# Patient Record
Sex: FEMALE | Race: BLACK OR AFRICAN AMERICAN | Employment: UNEMPLOYED | URBAN - METROPOLITAN AREA
[De-identification: names, ages, dates, MRNs, and addresses within clinical notes are randomized per-mention and may not be internally consistent; named-entity substitution may affect disease eponyms.]

---

## 2022-03-25 ENCOUNTER — ROUTINE PRENATAL (OUTPATIENT)
Dept: OBGYN CLINIC | Age: 32
End: 2022-03-25

## 2022-03-25 VITALS
HEIGHT: 67 IN | BODY MASS INDEX: 22.88 KG/M2 | HEART RATE: 85 BPM | SYSTOLIC BLOOD PRESSURE: 142 MMHG | DIASTOLIC BLOOD PRESSURE: 91 MMHG | WEIGHT: 145.8 LBS

## 2022-03-25 DIAGNOSIS — Z34.80 PRENATAL CARE OF MULTIGRAVIDA, ANTEPARTUM: ICD-10-CM

## 2022-03-25 DIAGNOSIS — Z12.4 ENCOUNTER FOR PAPANICOLAOU SMEAR FOR CERVICAL CANCER SCREENING: ICD-10-CM

## 2022-03-25 DIAGNOSIS — Z34.80 ENCOUNTER FOR SUPERVISION OF NORMAL PREGNANCY IN MULTIGRAVIDA, ANTEPARTUM: ICD-10-CM

## 2022-03-25 DIAGNOSIS — R03.0 ELEVATED BLOOD PRESSURE READING: ICD-10-CM

## 2022-03-25 DIAGNOSIS — Z11.3 SCREENING EXAMINATION FOR VENEREAL DISEASE: Primary | ICD-10-CM

## 2022-03-25 DIAGNOSIS — F32.A DEPRESSION AFFECTING PREGNANCY: ICD-10-CM

## 2022-03-25 DIAGNOSIS — O99.340 DEPRESSION AFFECTING PREGNANCY: ICD-10-CM

## 2022-03-25 PROBLEM — Z98.891 H/O: C-SECTION: Status: ACTIVE | Noted: 2022-03-25

## 2022-03-25 LAB
ANTIBODY SCREEN, EXTERNAL: NEGATIVE
CHLAMYDIA, EXTERNAL: NEGATIVE
HBSAG, EXTERNAL: NEGATIVE
HEPATITIS C AB,   EXT: NEGATIVE
HIV, EXTERNAL: NON REACTIVE
N. GONORRHEA, EXTERNAL: NEGATIVE
RPR, EXTERNAL: NON REACTIVE
RUBELLA, EXTERNAL: NORMAL

## 2022-03-25 PROCEDURE — 0501F PRENATAL FLOW SHEET: CPT | Performed by: OBSTETRICS & GYNECOLOGY

## 2022-03-25 RX ORDER — ESCITALOPRAM OXALATE 10 MG/1
5 TABLET ORAL EVERY OTHER DAY
COMMUNITY
Start: 2022-03-12

## 2022-03-25 NOTE — PROGRESS NOTES
[facility]  [email or web address]  [phone]    Noemi Nam MD, [address] [facility]     Chief complaint:  Irregular cycles  Last cycle; Patient's last menstrual period was 2022. This is a new concern and an evaluation is planned. The patient has not been previously tested for hemoglobin electrophoresis, CF, or SMA. C/o migraines. She is trying to wean to 10mg lexapro. She has been taking 20mg lexapro the past year, she tried to wean to 10mg, was having bad side affects. C/o depression/anxeity/panic attacks. C/o feeling light headed & cold. C/o trouble breathing sometimes. Last pregnancy emergency . Current pregnancy history:  Andrey Miller is a , 32 y.o. female BLACK/   She presents for the evaluation of irregular menses and a positive pregnancy test.    LMP history:  Patient's last menstrual period was 2022. The date of the beginning of her last menstrual period is certain. Her menses are regular. Her cycles occur about every 4 weeks. A urine pregnancy test was positive about 3 weeks ago. She was not using contraception at the estimated time of conception. Based on her LMP, her EGA is 8 weeks,0 days with and EDC of 2022. Ultrasound data:  She had an ultrasound today which revealed a viable marie pregnancy with a gestational age of 11 weeks and 6 days giving an EDC of 10/29/2022. TA ULTRASOUND PERFORMED  A SINGLE VIABLE 8W6D IUP IS SEEN WITH NORMAL CARDIAC RHYTHM. GESTATIONAL AGE BASED ON TODAYS ULTRASOUND. A NORMAL YOLK SAC IS SEEN. RIGHT OVARY APPEARS WITHIN NORMAL LIMITS. LEFT OVARY APPEARS WITHIN NORMAL LIMITS. NO FREE FLUID SEEN IN THE CDS. Pregnancy symptoms:  She reports fatigue  breast tenderness  nausea  \"morning sickness\". She denies fatigue  breast tenderness  nausea  \"morning sickness\"  positive home pregnancy test  frequent urination.   Since she found out she is pregnant, she has gained, 12 lbs. She reports her prepregnancy weight as 132 pounds. Relevant past pregnancy history:  She has the following pregnancy history:emergency  d/t BP  She does not have a history of  delivery. She does have a history of a prior  section. Relevant past medical history:(relevant to this pregnancy): Anxiety/depression     Pap smear history:  Last pap smear: none on file    Occupational history  Her occupation is: unemployed. Substance history:   She does report current tobacco use, she is trying to quit. She does not report current alcohol use. She does report current drug use, MJ. Exposure history: There are not indoor cat(s) in the home. The patient was instructed not to change cat litter boxes during pregnancy. She does report close contact with children on a regular basis. She has chicken pox or the vaccine in the past.   Patient does not report issues with domestic violence. Genetic Screening/Teratology Counseling:   (Includes patient, baby's father, or anyone in either family with:)  3.  Patient's age >/= 28 at EDC?--31      FOB age: 29years old. 2.  Thalassemia ([facility], [facility], [facility], or  background): MCV<80?--no  3. Neural tube defect (meningomyelocele, spina bifida, anencephaly)? --no  4. Congenital heart defect?--no  5. Down syndrome?--no  6. Leonardo-Sachs ([address] [place] [address])? --no  7. Canavan's Disease?--no  8. Familial Dysautonomia?--no   9. Sickle cell disease or trait ()? --yes and AA     Has she been tested for sickle trait: Unknown  10. Hemophilia or other blood disorders?--no  11. Muscular dystrophy?--no  12. Cystic fibrosis? --no  13. Mario's Chorea?--no  14. Mental retardation/autism (if yes was person tested for Fragile X)?-no  15. Other inherited genetic or chromosomal disorder?- no  16. Maternal metabolic disorder (DM, PKU, etc)? --no  17.   Patient or FOB with a child with a birth defect not listed above?--pt's sister : spina bifida  17a. Patient or FOB with a birth defect themselves?--no  25. Recurrent pregnancy loss, or stillbirth?--no  19. Any medications since LMP other than prenatal vitamins (include vitamins, supplements, OTC meds, drugs, alcohol)? --     20. Any other genetic/environmental exposure to discuss?--no. Infection History:  1. Lives with someone with TB or TB exposed?--no  2. Patient or partner has history of genital herpes?--no  3. Rash or viral illness since LMP?--no  4. History of STD (GC, CT, HPV, syphilis, HIV)? --no  5. Have you received a flu vaccine for the most recent flu season? -- no or unsure  6. Have you or your sexual partner(s) travelled to a [facility] area in the last six months? -- no  7. Have you received the COVID-19 vaccine? -- yes  No booster  Past Medical History:   Diagnosis Date    Abnormal Pap smear     LGSIL-colpo    Depression affecting pregnancy 3/25/2022    Essential hypertension     blood pressure up in office today    HX OTHER MEDICAL     recurrent UTI     History reviewed. No pertinent surgical history.   Social History     Occupational History    Not on file   Tobacco Use    Smoking status: Current Some Day Smoker     Last attempt to quit: 2014     Years since quittin.6    Smokeless tobacco: Not on file   Substance and Sexual Activity    Alcohol use: No    Drug use: Yes     Types: Marijuana    Sexual activity: Yes     Partners: Male     Birth control/protection: None     Family History   Problem Relation Age of Onset    Diabetes Mother     Hypertension Mother     Psychiatric Disorder Brother     Mental Retardation Brother     Heart Disease Maternal Grandmother     Hypertension Maternal Grandmother     Hypertension Maternal Grandfather     Diabetes Paternal Grandfather     Elevated Lipids Paternal Grandfather      OB History    Para Term  AB Living   3 1 1 0 1 1   SAB IAB Ectopic Molar Multiple Live Births   0 0 0   0 1      # Outcome Date GA Lbr Dennis/2nd Weight Sex Delivery Anes PTL Lv   3 Current            2 Term 05/03/12 37w2d  6 lb 5 oz (2.863 kg) M CS-Unspec   KALEB      Birth Comments: System Generated. Please review and update pregnancy details. 1 AB 07/20/11 5w0d       DEC     No Known Allergies  Prior to Admission medications    Medication Sig Start Date End Date Taking? Authorizing Provider   escitalopram oxalate (LEXAPRO) 10 mg tablet Take 10 mg by mouth daily. 3/12/22  Yes Provider, Historical   naproxen (NAPROSYN) 375 mg tablet Take 1 Tab by mouth two (2) times daily (with meals). Patient not taking: Reported on 3/25/2022 8/6/14   Tasha Appiah MD   prenatal vit-iron fumarate-fa (RIGHT STEP PRENATAL VITAMINS) 27-0.8 mg Tab tablet Take 1 Tab by mouth daily.   Patient not taking: Reported on 3/25/2022    Other, MD Arcelia        Review of Systems - History obtained from the patient  Constitutional: negative for weight loss, fever, night sweats  HEENT: negative for hearing loss, earache, congestion, snoring, sorethroat  CV: negative for chest pain, palpitations, edema  Resp: negative for cough, shortness of breath, wheezing  GI: negative for change in bowel habits, abdominal pain, black or bloody stools  : negative for frequency, dysuria, hematuria, vaginal discharge  MSK: negative for back pain, joint pain, muscle pain  Breast: negative for breast lumps, nipple discharge, galactorrhea  Skin :negative for itching, rash, hives  Neuro: negative for dizziness, headache, confusion, weakness  Psych: negative for anxiety, depression, change in mood  Heme/lymph: negative for bleeding, bruising, pallor    Objective:  Visit Vitals  BP (!) 142/91   Pulse 85   Ht 5' 7\" (1.702 m)   Wt 145 lb 12.8 oz (66.1 kg)   LMP 01/28/2022   BMI 22.84 kg/m²       Physical Exam:   Constitutional  · Appearance: well-nourished, well developed, alert, in no acute distress    HENT  · Head  · Face: appears normal  · Eyes: appear normal  · Ears: normal  · Mouth: normal  · Lips: no lesions    Neck  · Inspection/Palpation: normal appearance, no masses or tenderness  · Lymph Nodes: no lymphadenopathy present  · Thyroid: gland size normal, nontender, no nodules or masses present on palpation    Chest  · Respiratory Effort: breathing unlabored  · Auscultation: normal breath sounds    Cardiovascular  · Heart:  · Auscultation: regular rate and rhythm without murmur    Breasts  · Inspection of Breasts: breasts symmetrical, no skin changes, no discharge present, nipple appearance normal, no skin retraction present  · Palpation of Breasts and Axillae: no masses present on palpation, no breast tenderness  · Axillary Lymph Nodes: no lymphadenopathy present    Gastrointestinal  · Abdominal Examination: abdomen non-tender to palpation, normal bowel sounds, no masses present  · Liver and spleen: no hepatomegaly present, spleen not palpable  · Hernias: no hernias identified    Genitourinary  · External Genitalia: normal appearance for age, no discharge present, no tenderness present, no inflammatory lesions present, no masses present, no atrophy present  · Vagina: normal vaginal vault without central or paravaginal defects, no discharge present, no inflammatory lesions present, no masses present  · Bladder: non-tender to palpation  · Urethra: appears normal  · Cervix: normal appearing with discharge or lesions, os closed  · Uterus: enlarged, normal shape, soft  · Adnexa: no adnexal tenderness present, no adnexal masses present  · Perineum: perineum within normal limits, no evidence of trauma, no rashes or skin lesions present  · Anus: anus within normal limits, no hemorrhoids present  · Inguinal Lymph Nodes: no lymphadenopathy present    Skin  · General Inspection: no rash, no lesions identified    Neurologic/Psychiatric  · Mental Status:  · Orientation: grossly oriented to person, place and time  · Mood and Affect: mood normal, affect appropriate    Assessment:   Irregular cycles  Encounter Diagnoses   Name Primary?  Screening examination for venereal disease Yes    Encounter for Papanicolaou smear for cervical cancer screening     Encounter for supervision of normal pregnancy in multigravida, antepartum     Prenatal care of multigravida, antepartum     Elevated blood pressure reading     Depression affecting pregnancy      Due date:  edc    Plan:   We discussed options of genetic screening and diagnostic testing including:  CF testing, CVS, amniocentesis first trimester screening/NT, MSAFP, and NIPT (handout given to patient for review and consent)  She is not interested in prenatal genetic testing of her fetus. Plan: FS MFM (labile BP, fh spina bifida: sister, Paradise Jordan)  The course of pregnancy discussed including visit schedule, ultrasounds, lab testing, etc.  Pt advised to avoid alcoholic beverages and illicit/recreational drugs use  Recommend taking prenatal vitamins or folic acid daily with DHA/fish oil. The hospital and practice style discussed with coverage system. We discussed nutrition, toxoplasmosis precautions, sexual activity, exercise, need for influenza vaccine, environmental and work hazards, travel advice, screen for domestic violence, need for seat belts. We discussed seafood, unpasteurized dairy products, deli meat, artificial sweeteners, and caffeine intake. We recommend avoiding chemical and toxin exposures when possible. Information on prenatal and breastfeeding classes given. Information on circumcision given in ACOG packet. Patient encouraged not to smoke. Discussed current prescription drug use. Given medication list.  Discussed the use of over the counter medications and chemicals. She is advised to contact her MD with any questions.     Pt understands risk of hemorrhage during pregnancy and post delivery and would accept blood products if necessary in life-threatening emergencies  We discussed signs and symptoms of abnormal pregnancies and miscarriage. Handouts given to pt. We discussed potential risk of pregnancy and maternal complications if patient has a COVID-19 infection during pregnancy and reviewed COVID-19 precautions and avoiding high risk transmission situations. Disc importance of mood management in pregnancy: pt unable to tolerate being off lexapro  Disc rba of SSRI/SRNI during pregnancy and affects on fetus and  including growth and development issues and serotonin withdrawal.  Rec notifying pediatrician if taking lexapro at [address] Disc ? CHTN, will check pr cr ratio and follow BP      Physician review of ultrasound performed by technician  Today's ultrasound report and images were reviewed and discussed with the patient. Please see images and imaging report entered by technician in PACS for more detail and progress note and diagnosis entered by MD.    Abad Peres MD    Orders Placed This Encounter    CULTURE, URINE    HEMOGLOBIN FRACTIONATION    HEPATITIS C AB    HEP B SURFACE AG    HIV SCREEN, [address] [place] [address]. W/REFLEX CONFIRM    CBC W/O DIFF    RUBELLA AB, IGG    RPR    PROTEIN/CREATININE RATIO, URINE    TYPE & SCREEN    escitalopram oxalate (LEXAPRO) 10 mg tablet    PAP IG, CT-NG, HPV RFX HPV 16/18,45     Follow-up and Dispositions    · Return in about 4 weeks (around 2022). On this date, 3/25/2022,  I have spent 45 minutes reviewing previous notes, test results and face to face with the patient discussing the diagnosis and importance of compliance with the treatment plan as well as documenting on the day of the visit.

## 2022-03-25 NOTE — PATIENT INSTRUCTIONS
Learning About Pregnancy  Your Care Instructions     Your health in the early weeks of your pregnancy is particularly important for your baby's health. Take good care of yourself. Anything you do that harms your body can also harm your baby. Make sure to go to all of your doctor appointments. Regular checkups will help keep you and your baby healthy. How can you care for yourself at home? Diet    · Eat a balanced diet. Make sure your diet includes plenty of beans, peas, and leafy green vegetables.     · Do not skip meals or go for many hours without eating. If you are nauseated, try to eat a small, healthy snack every 2 to 3 hours.     · Do not eat fish that has a high level of mercury, such as shark, swordfish, or mackerel. Do not eat more than one can of tuna each week.     · Drink plenty of fluids. If you have kidney, heart, or liver disease and have to limit fluids, talk with your doctor before you increase the amount of fluids you drink.     · Cut down on caffeine, such as coffee, tea, and cola.     · Do not drink alcohol, such as beer, wine, or hard liquor.     · Take a multivitamin that contains at least 400 micrograms (mcg) of folic acid to help prevent birth defects. Fortified cereal and whole wheat bread are good additional sources of folic acid.     · Increase the calcium in your diet. Try to drink a quart of skim milk each day. You may also take calcium supplements and choose foods such as cheese and yogurt. Lifestyle    · Make sure you go to your follow-up appointments.     · Get plenty of rest. You may be unusually tired while you are pregnant.     · Get at least 30 minutes of exercise on most days of the week. Walking is a good choice. If you have not exercised in the past, start out slowly. Take several short walks each day.     · Do not smoke. If you need help quitting, talk to your doctor about stop-smoking programs.  These can increase your chances of quitting for good.     · Do not touch cat feces or litter boxes. Also, wash your hands after you handle raw meat, and fully cook all meat before you eat it. Wear gloves when you work in the yard or garden, and wash your hands well when you are done. Cat feces, raw or undercooked meat, and contaminated dirt can cause an infection that may harm your baby or lead to a miscarriage.     · Avoid things that can make your body too hot and may be harmful to your baby, such as a hot tub or sauna. Or talk with your doctor before doing anything that raises your body temperature. Your doctor can tell you if it's safe.     · Avoid chemical fumes, paint fumes, or poisons.     · Do not use illegal drugs, marijuana, or alcohol. Medicines    · Review all of your medicines with your doctor. Some of your routine medicines may need to be changed to protect your baby.     · Use acetaminophen (Tylenol) to relieve minor problems, such as a mild headache or backache or a mild fever with cold symptoms. Do not use nonsteroidal anti-inflammatory drugs (NSAIDs), such as ibuprofen (Advil, Motrin) or naproxen (Aleve), unless your doctor says it is okay.     · Do not take two or more pain medicines at the same time unless the doctor told you to. Many pain medicines have acetaminophen, which is Tylenol. Too much acetaminophen (Tylenol) can be harmful.     · Take your medicines exactly as prescribed. Call your doctor if you think you are having a problem with your medicine. To manage morning sickness    · If you feel sick when you first wake up, try eating a small snack (such as crackers) before you get out of bed. Allow some time to digest the snack, and then get out of bed slowly.     · Do not skip meals or go for long periods without eating.  An empty stomach can make nausea worse.     · Eat small, frequent meals instead of three large meals each day.     · Drink plenty of fluids.     · Eat foods that are high in protein but low in fat.     · If you are taking iron supplements, ask your doctor if they are necessary. Iron can make nausea worse.     · Avoid any smells, such as coffee, that make you feel sick.     · Get lots of rest. Morning sickness may be worse when you are tired. Follow-up care is a key part of your treatment and safety. Be sure to make and go to all appointments, and call your doctor if you are having problems. It's also a good idea to know your test results and keep a list of the medicines you take. Where can you learn more? Go to http://www.gray.com/  Enter E382 in the search box to learn more about \"Learning About Pregnancy. \"  Current as of: June 16, 2021               Content Version: 13.2  © 1933-5709 [employer]wise, Inc[employer]. Care instructions adapted under license by [employer] (which disclaims liability or warranty for this information). If you have questions about a medical condition or this instruction, always ask your healthcare professional. [place] [address] any warranty or liability for your use of this information.

## 2022-03-27 LAB
CREAT UR-MCNC: 93.8 MG/DL
PROT UR-MCNC: 27.4 MG/DL
PROT/CREAT UR: 292 MG/G CREAT (ref 0–200)

## 2022-03-27 NOTE — PROGRESS NOTES
Update chart, PN labs/problem list, if needed  Add to PL ur pr cr ratio: 292  Please start ob episode with first viable OB visit

## 2022-03-28 LAB
ABO GROUP BLD: NORMAL
BLD GP AB SCN SERPL QL: NEGATIVE
ERYTHROCYTE [DISTWIDTH] IN BLOOD BY AUTOMATED COUNT: 13 % (ref 11.7–15.4)
HBV SURFACE AG SERPL QL IA: NEGATIVE
HCT VFR BLD AUTO: 38 % (ref 34–46.6)
HCV AB S/CO SERPL IA: <0.1 S/CO RATIO (ref 0–0.9)
HGB A MFR BLD ELPH: 97.2 % (ref 96.4–98.8)
HGB A2 MFR BLD ELPH: 2.8 % (ref 1.8–3.2)
HGB BLD-MCNC: 12.4 G/DL (ref 11.1–15.9)
HGB F MFR BLD ELPH: 0 % (ref 0–2)
HGB FRACT BLD-IMP: NORMAL
HGB S MFR BLD ELPH: 0 %
HIV 1+2 AB+HIV1 P24 AG SERPL QL IA: NON REACTIVE
MCH RBC QN AUTO: 30.5 PG (ref 26.6–33)
MCHC RBC AUTO-ENTMCNC: 32.6 G/DL (ref 31.5–35.7)
MCV RBC AUTO: 93 FL (ref 79–97)
PLATELET # BLD AUTO: 262 X10E3/UL (ref 150–450)
RBC # BLD AUTO: 4.07 X10E6/UL (ref 3.77–5.28)
RH BLD: POSITIVE
RPR SER QL: NON REACTIVE
RUBV IGG SERPL IA-ACNC: 7.89 INDEX
WBC # BLD AUTO: 6.5 X10E3/UL (ref 3.4–10.8)

## 2022-03-29 DIAGNOSIS — Z34.80 PRENATAL CARE OF MULTIGRAVIDA, ANTEPARTUM: ICD-10-CM

## 2022-03-29 DIAGNOSIS — Z82.79 FAMILY HISTORY OF SPINA BIFIDA: ICD-10-CM

## 2022-03-29 DIAGNOSIS — R09.89 LABILE BLOOD PRESSURE: Primary | ICD-10-CM

## 2022-03-29 LAB
C TRACH RRNA CVX QL NAA+PROBE: NEGATIVE
CYTOLOGIST CVX/VAG CYTO: NORMAL
CYTOLOGY CVX/VAG DOC CYTO: NORMAL
CYTOLOGY CVX/VAG DOC THIN PREP: NORMAL
CYTOLOGY HISTORY:: NORMAL
DX ICD CODE: NORMAL
HPV I/H RISK 4 DNA CVX QL PROBE+SIG AMP: NEGATIVE
Lab: NORMAL
N GONORRHOEA RRNA CVX QL NAA+PROBE: NEGATIVE
OTHER STN SPEC: NORMAL
STAT OF ADQ CVX/VAG CYTO-IMP: NORMAL

## 2022-03-29 NOTE — PROGRESS NOTES
Normal pap smear, message sent if [address] active. Update PMH/HM: include: Date of pap, Cytology: wnl. For HR HPV results: list NEG or POS, when done.   Update pnl

## 2022-04-04 ENCOUNTER — TELEPHONE (OUTPATIENT)
Dept: OBGYN CLINIC | Age: 32
End: 2022-04-04

## 2022-04-04 NOTE — TELEPHONE ENCOUNTER
TP pt calling 10w2d c/o migraines daily. She uses tylenol with no improvement. She denies n/v. She states she is drinking plenty of water. Please advise.

## 2022-04-22 ENCOUNTER — ROUTINE PRENATAL (OUTPATIENT)
Dept: OBGYN CLINIC | Age: 32
End: 2022-04-22
Payer: MEDICAID

## 2022-04-22 VITALS
DIASTOLIC BLOOD PRESSURE: 85 MMHG | HEIGHT: 67 IN | BODY MASS INDEX: 23.86 KG/M2 | SYSTOLIC BLOOD PRESSURE: 131 MMHG | WEIGHT: 152 LBS

## 2022-04-22 DIAGNOSIS — Z34.80 PRENATAL CARE OF MULTIGRAVIDA, ANTEPARTUM: Primary | ICD-10-CM

## 2022-04-22 LAB — URINALYSIS, EXTERNAL: NORMAL

## 2022-04-22 PROCEDURE — 0502F SUBSEQUENT PRENATAL CARE: CPT | Performed by: OBSTETRICS & GYNECOLOGY

## 2022-04-22 NOTE — PROGRESS NOTES
_ [facility]  [email or web address]  [phone]    Jessica Wells MD, FACOG     Follow-up OB visit    Chief Complaint   Patient presents with   [facility] Routine Prenatal Visit       Patient Active Problem List    Diagnosis Date Noted    H/O:  2022    Prenatal care of multigravida, antepartum 2022    Depression affecting pregnancy 2022    Gestational hypertension 2012    Missed  2011          The patient reports the following concerns: declines genetic testing last visit. On 10mg lexapro now, doing well with it. Last visit, her EPDS was 20. Ur cx today, not collected at EOB visit. Trying to wean lexapro with PCP: was on 20, then weaning to 10, did not tolerate every other day. No weapons at home. But has had SI in past.     Vitals:    22 0951   BP: 131/85   Weight: 152 lb (68.9 kg)   Height: 5' 7\" (1.702 m)     See PN flowsheet for exam    32 y.o.  12w6d   Encounter Diagnosis   Name Primary?  Prenatal care of multigravida, antepartum Yes   ? CHTN  Depression    Declines genetic testing/screening  Disc balancing risk of lexapro vs management of depression  Rec adding counseling: Ho given  Plan MFM fu   [] SAB/bleeding precautions reviewed   [] PTL/PPROM precautions reviewed   [] Labor precautions reviewed   [] Fetal kick counts discussed   [] Labs reviewed with patient   [] [place] precautions reviewed   [] Consent reviewed   [] Handouts given to pt   [] Glucola handout    [] GBS/labor/Magic Hour handout   []    [] We reviewed CDC recommendations for Tdap for patient and close contacts and RBA of receiving in pregnancy, advised obtaining in third trimester   [] Reviewed healthy nutrition in pregnancy and good exercise practices   [] We disc safer medications in pregnancy and referred patient to [facility] resources   [] We reviewed CDC recommendations for flu vaccine and RBA of receiving in pregnancy   []    []    []           Orders Placed This Encounter   Brenda Estrada MD

## 2022-04-25 LAB — BACTERIA UR CULT: NORMAL

## 2022-04-25 NOTE — PROGRESS NOTES
No UTI  If EOB urine: update PNL. If FOB but not EOB ur cx: add to OB PL: neg ur cx with date  [facility] message sent if active.

## 2022-04-26 DIAGNOSIS — Z34.80 PRENATAL CARE OF MULTIGRAVIDA, ANTEPARTUM: ICD-10-CM

## 2022-05-20 ENCOUNTER — ROUTINE PRENATAL (OUTPATIENT)
Dept: OBGYN CLINIC | Age: 32
End: 2022-05-20
Payer: MEDICAID

## 2022-05-20 VITALS
WEIGHT: 152 LBS | HEIGHT: 67 IN | SYSTOLIC BLOOD PRESSURE: 135 MMHG | BODY MASS INDEX: 23.86 KG/M2 | DIASTOLIC BLOOD PRESSURE: 84 MMHG | HEART RATE: 99 BPM

## 2022-05-20 DIAGNOSIS — Z34.80 PRENATAL CARE OF MULTIGRAVIDA, ANTEPARTUM: Primary | ICD-10-CM

## 2022-05-20 DIAGNOSIS — R03.0 ELEVATED BLOOD PRESSURE READING: ICD-10-CM

## 2022-05-20 DIAGNOSIS — O26.899 VAGINAL DISCHARGE DURING PREGNANCY, ANTEPARTUM: ICD-10-CM

## 2022-05-20 DIAGNOSIS — N89.8 VAGINAL DISCHARGE DURING PREGNANCY, ANTEPARTUM: ICD-10-CM

## 2022-05-20 DIAGNOSIS — R10.32 LLQ PAIN: ICD-10-CM

## 2022-05-20 DIAGNOSIS — G43.809 OTHER MIGRAINE WITHOUT STATUS MIGRAINOSUS, NOT INTRACTABLE: ICD-10-CM

## 2022-05-20 LAB — AFP, MATERNAL, EXTERNAL: NORMAL

## 2022-05-20 PROCEDURE — 99212 OFFICE O/P EST SF 10 MIN: CPT | Performed by: OBSTETRICS & GYNECOLOGY

## 2022-05-20 RX ORDER — PROMETHAZINE HYDROCHLORIDE 12.5 MG/1
12.5 TABLET ORAL
Qty: 30 TABLET | Refills: 0 | Status: SHIPPED | OUTPATIENT
Start: 2022-05-20

## 2022-05-20 RX ORDER — CALCIUM CARBONATE 200(500)MG
1 TABLET,CHEWABLE ORAL DAILY
COMMUNITY

## 2022-05-20 RX ORDER — ACETAMINOPHEN 325 MG/1
TABLET ORAL
COMMUNITY

## 2022-05-20 NOTE — PROGRESS NOTES
[facility]  [email or web address]  [phone]    Arleth Rust MD, FACOG       OB/GYN: OB Problem visit    Chief Complaint:   Chief Complaint   Patient presents with    Routine Prenatal Visit    Pelvic Pain       Patient Active Problem List    Diagnosis    H/O:     Prenatal care of multigravida, antepartum     Urine dip each visit d/t BP  Mood changes on lexapro 10 mg, does not want to inc 2022  Ho cs; desires ? MFM FS  (labile BP, fh spina bifida: sister, Dennie Ram)  22 MFM FS  Declines genetic screening testing 3/25/2022 , will notify office if changes her mind  HO PIH< elevated BP fist tri: ? CHTN: add cmp to next labs ur pr cr ratio on 22 done 2022    Hemoglobin electo WNL  3/25/22 ur pr cr random ratio: 292  Urine cx needed at  visit; not collected at EOB  -- done 22 neg  Ho migraine HA: no prepreg meds. EPDS NV if not done recently 2022            Depression affecting pregnancy     On lexapro 10, unable to wean      Gestational hypertension    Missed        History of Present Illness: The patient is a 32 y.o.  female. c/o bad left side cramping for a couple hours at a time intermittently. Denies spotting    Reports more discharge for the past 3 days, denies itching, burning or odor. Reports nausea, may want rx. C/o headaches & migraines, tylenol not helping. C/o feeling very lightheaded upon standing up. No prior hx of anemia. Unsure if PNV has iron. Cookout this weekend with boyfriends parents. Declines genetic testing. This is a new problem. This is a routinely scheduled OB appointment. She reports the symptoms are is unchanged. Aggravating factors include none. Alleviating factors include none. She does not have other concerns.     PFSH:  Past Medical History:   Diagnosis Date    Abnormal Pap smear     LGSIL-colpo    Depression affecting pregnancy 3/25/2022    Encounter for Papanicolaou smear for cervical cancer screening 2022    neg/hpv neg    Essential hypertension     blood pressure up in office today    HX OTHER MEDICAL     recurrent UTI     History reviewed. No pertinent surgical history. Family History   Problem Relation Age of Onset    Diabetes Mother     Hypertension Mother     Psychiatric Disorder Brother     Mental Retardation Brother     Heart Disease Maternal Grandmother     Hypertension Maternal Grandmother     Hypertension Maternal Grandfather     Diabetes Paternal Grandfather     Elevated Lipids Paternal Grandfather      Social History     Tobacco Use    Smoking status: Current Some Day Smoker     Last attempt to quit: 2014     Years since quittin.8    Smokeless tobacco: Not on file   Substance Use Topics    Alcohol use: No    Drug use: Yes     Types: Marijuana     No Known Allergies  Current Outpatient Medications   Medication Sig    acetaminophen (TylenoL) 325 mg tablet Take  by mouth every four (4) hours as needed for Pain.  calcium carbonate (TUMS) 200 mg calcium (500 mg) chew Take 1 Tablet by mouth daily.  promethazine (PHENERGAN) 12.5 mg tablet Take 1 Tablet by mouth every six (6) hours as needed for Nausea (patient may take 1/2 dose if causing drowsiness. ).  escitalopram oxalate (LEXAPRO) 10 mg tablet Take 10 mg by mouth daily.  prenatal vit-iron fumarate-fa (RIGHT STEP PRENATAL VITAMINS) 27-0.8 mg Tab tablet Take 1 Tablet by mouth daily.  naproxen (NAPROSYN) 375 mg tablet Take 1 Tab by mouth two (2) times daily (with meals). (Patient not taking: Reported on 3/25/2022)     No current facility-administered medications for this visit.        Review of Systems:  History obtained from the patient and written ROS questionnaire  Constitutional: negative for fevers, chills and weight loss  ENT ROS: negative for - hearing change, oral lesions or visual changes  Respiratory: negative for cough, wheezing or dyspnea on exertion  Cardiovascular: negative for chest pain, irregular heart beats, exertional chest pressure/discomfort  Gastrointestinal: negative for dysphagia, nausea and vomiting  Genito-Urinary ROS: , see HPI  Inteument/breast: negative for rash, breast lump and nipple discharge  Musculoskeletal:negative for stiff joints, neck pain and muscle weakness  Endocrine ROS: negative for - breast changes, galactorrhea or temperature intolerance  Hematological and Lymphatic ROS: negative for - blood clots, bruising or swollen lymph nodes    Physical Exam:  Visit Vitals  /84   Pulse 99   Ht 5' 7\" (1.702 m)   Wt 152 lb (68.9 kg)   BMI 23.81 kg/m²       GENERAL: alert, well appearing, and in no distress  HEAD; normocephalic, atraumatic  ABDOMEN: soft, nontender, nondistended, no masses or organomegaly   BACK: normal range of motion, no tenderness, no CVAT   EGBUS: no lesions, no inflammation, no masses  VULVA: normal appearing vulva with no masses, tenderness or lesions  VAGINA: normal appearing vagina with normal color, no lesions, white thin discharge  CERVIX: normal appearing cervix without discharge or lesions, non tender closed  UTERUS: uterus is enlarged in size, gravid appropriate for gestational age  ADNEXA: normal adnexa in size, nontender and no masses  NEURO: alert, oriented, normal speech    See PN flowsheet for additional notes and exam    Assessment:  32 y.o.  16w6d   Encounter Diagnoses   Name Primary?  Prenatal care of multigravida, antepartum Yes    Elevated blood pressure reading     Comment: ho    Other migraine without status migrainosus, not intractable     Vaginal discharge during pregnancy, antepartum     LLQ pain        Plan:  An evaluation of this patient's concern is planned.   The patient is advised that she should contact the office if she does not note improvement or if symptoms recur  She should contact our office with any questions or concerns  She could keep her routine OB appointment. We discussed potential causes of lower abdominal/pelvic pain: GYN, GI, , musculoskeletal, infectious process, adhesions, or other etiology  We discussed evaluation of lower abdominal/pelvic pain: including but not limited to observation, surgical evaluation/laparoscopy, imaging   We discussed treatment of lower abdominal/pelvic pain: including but not limited to: pain medication, hormonal management, surgical intervention, bowel regimen. Since pain can be a symptoms of an underlying abnormal process she is encouraged to contact my office with persistent symptoms for additional evaluation and treatment if needed. Bowel regimen prn  Disc options for HA management, pt declines  Disc option for mood management, pt declines lexapro dose increase, rec adding counseling  Disc discomforts of pregnancy, If persistent pain or NI; notify MD or go to ER if severe    Orders Placed This Encounter    AFP, MATERNAL SCREEN    NUSWAB VAGINITIS ([facility])    METABOLIC PANEL, COMPREHENSIVE    PROTEIN/CREATININE RATIO, URINE    promethazine (PHENERGAN) 12.5 mg tablet       No results found for this visit on 05/20/22.     Hari Wilson MD

## 2022-05-24 LAB
A VAGINAE DNA VAG QL NAA+PROBE: ABNORMAL SCORE
BVAB2 DNA VAG QL NAA+PROBE: ABNORMAL SCORE
C ALBICANS DNA VAG QL NAA+PROBE: NEGATIVE
C GLABRATA DNA VAG QL NAA+PROBE: NEGATIVE
MEGA1 DNA VAG QL NAA+PROBE: ABNORMAL SCORE
T VAGINALIS DNA VAG QL NAA+PROBE: NEGATIVE

## 2022-05-24 RX ORDER — METRONIDAZOLE 500 MG/1
500 TABLET ORAL 2 TIMES DAILY
Qty: 14 TABLET | Refills: 0 | Status: SHIPPED | OUTPATIENT
Start: 2022-05-24 | End: 2022-05-31

## 2022-05-25 LAB
AFP INTERP SERPL-IMP: NORMAL
AFP INTERP SERPL-IMP: NORMAL
AFP MOM SERPL: 0.64
AFP SERPL-MCNC: 27.6 NG/ML
AGE AT DELIVERY: 32.2 YR
ALBUMIN SERPL-MCNC: 4.1 G/DL (ref 3.8–4.8)
ALBUMIN/GLOB SERPL: 1.7 {RATIO} (ref 1.2–2.2)
ALP SERPL-CCNC: 54 IU/L (ref 44–121)
ALT SERPL-CCNC: 66 IU/L (ref 0–32)
AST SERPL-CCNC: 44 IU/L (ref 0–40)
BILIRUB SERPL-MCNC: 0.2 MG/DL (ref 0–1.2)
BUN SERPL-MCNC: 12 MG/DL (ref 6–20)
BUN/CREAT SERPL: 24 (ref 9–23)
CALCIUM SERPL-MCNC: 9.1 MG/DL (ref 8.7–10.2)
CHLORIDE SERPL-SCNC: 100 MMOL/L (ref 96–106)
CO2 SERPL-SCNC: 17 MMOL/L (ref 20–29)
COMMENT, 018013: NORMAL
CREAT SERPL-MCNC: 0.5 MG/DL (ref 0.57–1)
EGFR: 129 ML/MIN/1.73
GA METHOD: NORMAL
GA: 16.9 WEEKS
GLOBULIN SER CALC-MCNC: 2.4 G/DL (ref 1.5–4.5)
GLUCOSE SERPL-MCNC: 72 MG/DL (ref 65–99)
IDDM PATIENT QL: NO
MULTIPLE PREGNANCY: NO
NEURAL TUBE DEFECT RISK FETUS: NORMAL %
POTASSIUM SERPL-SCNC: 4 MMOL/L (ref 3.5–5.2)
PROT SERPL-MCNC: 6.5 G/DL (ref 6–8.5)
RESULTS, 017004: NORMAL
SODIUM SERPL-SCNC: 138 MMOL/L (ref 134–144)

## 2022-05-25 NOTE — PROGRESS NOTES
Abnormal,Consistent with BV   Notify pt if [place] message not read or not active.   Rx:   flagyl 500mg bid   x 7 days    May cause nausea, take with food  Avoid etoh during treatment and 1 day following  Notify MD if NI after 1 week    17w3d    (If patient prefers vaginal tx't  0.75 %t metronidazole vaginal gel   5 grams qhs per vagina  x5 days)

## 2022-05-25 NOTE — PROGRESS NOTES
Follow up call to Ms. Josef Hernandez Pt verified self and birth date for privacy precautions. Patient was advised of results . Ms. Vashti Harrison acknowledged understanding and all questions were answered to patients satisfaction. No further questions or concerns at this time.      Updated PL

## 2022-05-26 NOTE — PROGRESS NOTES
Normal results, add to prenatal records. We can review in detail with patient at next visit. [address] message sent if active  Add cr/lft to pl w date  Rec repeat CMP ~2 wks:notify pt: can she do lab draw on 6/6 when sees MFM? Does she have an ho abnormal liver enzymes in past or h/o hepatitis? Pls contact pt?  Liver enzymes elevated, unsure of cause: need to repeat lab per note above  Rec MC sign up

## 2022-05-26 NOTE — PROGRESS NOTES
Follow up call to Ms. Bahena - Pt verified self and birth date for privacy precautions. Patient was advised of results. Pt on lab schedule for 6/6/ after MFM. Pt has no hx of abn liver enzymes or hepatitis. Ms. Roberto Zamarripa acknowledged understanding and all questions were answered to patients satisfaction. No further questions or concerns at this time.

## 2022-06-01 ENCOUNTER — TELEPHONE (OUTPATIENT)
Dept: OBGYN CLINIC | Age: 32
End: 2022-06-01

## 2022-06-01 NOTE — TELEPHONE ENCOUNTER
32year old  18w4d pregnant    Patient calling to say that she has been having problems with nausea for the 2 weeks and has not been able to keep anything down for the past three days and is vomiting    Patient reports her stomach is hurting and she has a headache and she thinks she blacked out yesterday    Patient is on day 5 of 7 taking metroNIDAZOLE (FlagyL) 500 mg tablet         Patient is voiding and was advised per MD to seek evaluation at er for fluids and to keep her appointment on  for ultrasound and follow up      Patient verbalized understanding.

## 2022-06-06 ENCOUNTER — HOSPITAL ENCOUNTER (OUTPATIENT)
Dept: PERINATAL CARE | Age: 32
Discharge: HOME | End: 2022-06-06
Attending: OBSTETRICS & GYNECOLOGY
Payer: MEDICAID

## 2022-06-06 ENCOUNTER — LAB ONLY (OUTPATIENT)
Dept: OBGYN CLINIC | Age: 32
End: 2022-06-06

## 2022-06-06 DIAGNOSIS — R79.89 ABNORMAL BLOOD CREATININE LEVEL: ICD-10-CM

## 2022-06-06 DIAGNOSIS — Z34.80 PRENATAL CARE OF MULTIGRAVIDA, ANTEPARTUM: Primary | ICD-10-CM

## 2022-06-06 PROCEDURE — 76811 OB US DETAILED SNGL FETUS: CPT | Performed by: OBSTETRICS & GYNECOLOGY

## 2022-06-07 LAB
ALBUMIN SERPL-MCNC: 4.2 G/DL (ref 3.8–4.8)
ALBUMIN/GLOB SERPL: 1.9 {RATIO} (ref 1.2–2.2)
ALP SERPL-CCNC: 64 IU/L (ref 44–121)
ALT SERPL-CCNC: 27 IU/L (ref 0–32)
AST SERPL-CCNC: 22 IU/L (ref 0–40)
BILIRUB SERPL-MCNC: <0.2 MG/DL (ref 0–1.2)
BUN SERPL-MCNC: 8 MG/DL (ref 6–20)
BUN/CREAT SERPL: 16 (ref 9–23)
CALCIUM SERPL-MCNC: 9.1 MG/DL (ref 8.7–10.2)
CHLORIDE SERPL-SCNC: 102 MMOL/L (ref 96–106)
CO2 SERPL-SCNC: 23 MMOL/L (ref 20–29)
CREAT SERPL-MCNC: 0.51 MG/DL (ref 0.57–1)
EGFR: 128 ML/MIN/1.73
GLOBULIN SER CALC-MCNC: 2.2 G/DL (ref 1.5–4.5)
GLUCOSE SERPL-MCNC: 66 MG/DL (ref 65–99)
POTASSIUM SERPL-SCNC: 4 MMOL/L (ref 3.5–5.2)
PROT SERPL-MCNC: 6.4 G/DL (ref 6–8.5)
SODIUM SERPL-SCNC: 139 MMOL/L (ref 134–144)

## 2022-06-09 NOTE — PROGRESS NOTES
Normal results, add to prenatal records. We can review in detail with patient at next visit. [address] message sent if active.   Add normal lft to pl w date please

## 2022-06-17 ENCOUNTER — TELEPHONE (OUTPATIENT)
Dept: OBGYN CLINIC | Age: 32
End: 2022-06-17

## 2022-06-22 ENCOUNTER — ROUTINE PRENATAL (OUTPATIENT)
Dept: OBGYN CLINIC | Age: 32
End: 2022-06-22
Payer: MEDICAID

## 2022-06-22 VITALS
SYSTOLIC BLOOD PRESSURE: 119 MMHG | WEIGHT: 155.4 LBS | DIASTOLIC BLOOD PRESSURE: 83 MMHG | HEIGHT: 67 IN | HEART RATE: 87 BPM | BODY MASS INDEX: 24.39 KG/M2

## 2022-06-22 DIAGNOSIS — Z34.80 PRENATAL CARE OF MULTIGRAVIDA, ANTEPARTUM: ICD-10-CM

## 2022-06-22 DIAGNOSIS — Z98.891 H/O: C-SECTION: Primary | ICD-10-CM

## 2022-06-22 PROCEDURE — 99212 OFFICE O/P EST SF 10 MIN: CPT | Performed by: OBSTETRICS & GYNECOLOGY

## 2022-06-22 RX ORDER — ZINC GLUCONATE 10 MG
LOZENGE ORAL
COMMUNITY

## 2022-06-22 NOTE — PROGRESS NOTES
[facility]  [email or web address]  [phone]    Asia Ramírez MD, FACOG       OB/GYN: OB Problem visit    Chief Complaint:   Chief Complaint   Patient presents with    Routine Prenatal Visit       Patient Active Problem List    Diagnosis    H/O:     Prenatal care of multigravida, antepartum     Urine dip each visit d/t BP  Mood changes on lexapro 10 mg, does not want to inc 2022  Ho cs; desires ? MFM FS  (labile BP, fh spina bifida: sister, Jose G Arts), [facility]  22 MFM FS  Declines genetic screening testing 3/25/2022 , will notify office if changes her mind  HO PIH< elevated BP fist tri: ? CHTN: add cmp to next labs ur pr cr ratio on 22 done 2022 BV -flagyl   cr: 0.05,/lft    afp neg  22 lft: AST 22, ALT 27  Hemoglobin electo WNL  3/25/22 ur pr cr random ratio: 292  22 micro/cr ratio 15   Urine cx needed at  visit; not collected at EOB  -- done 22 neg  Ho migraine HA: no prepreg meds. EPDS NV if not done recently 2022 EPDS 16  tob and mj use  Depression lexapro Rec counseling          Depression affecting pregnancy     On lexapro 10, unable to wean      Gestational hypertension    Missed        History of Present Illness: The patient is a 32 y.o.  female. Pt reports right breast pains for the past 3 days. Hands & arm swelling. Denies vision changes. 22 next MFM appt. Last MFM on 22. Reports SOB & feeling she may pass out. after feeling like that pt reports feeling hot & headache & feeling dizzy. Reports nausea, promethazine doesn't work. Had argument with her mother, increased stress: SI, resolved, no active plan. Not seeing a counselor, does not want to change meds. This is a new problem. This is a routinely scheduled OB appointment. She reports the symptoms are is unchanged. Aggravating factors include none.    Alleviating factors include none.    She does not have other concerns. PFSH:  Past Medical History:   Diagnosis Date    Abnormal Pap smear     LGSIL-colpo    Depression affecting pregnancy 3/25/2022    Encounter for Papanicolaou smear for cervical cancer screening 2022    neg/hpv neg    Essential hypertension     blood pressure up in office today    HX OTHER MEDICAL     recurrent UTI     History reviewed. No pertinent surgical history. Family History   Problem Relation Age of Onset    Diabetes Mother     Hypertension Mother     Psychiatric Disorder Brother     Mental Retardation Brother     Heart Disease Maternal Grandmother     Hypertension Maternal Grandmother     Hypertension Maternal Grandfather     Diabetes Paternal Grandfather     Elevated Lipids Paternal Grandfather      Social History     Tobacco Use    Smoking status: Current Some Day Smoker     Last attempt to quit: 2014     Years since quittin.9    Smokeless tobacco: Not on file   Substance Use Topics    Alcohol use: No    Drug use: Yes     Types: Marijuana     No Known Allergies  Current Outpatient Medications   Medication Sig    BABY ASPIRIN PO Take  by mouth.  magnesium 250 mg tab Take  by mouth.  acetaminophen (TylenoL) 325 mg tablet Take  by mouth every four (4) hours as needed for Pain.  calcium carbonate (TUMS) 200 mg calcium (500 mg) chew Take 1 Tablet by mouth daily.  escitalopram oxalate (LEXAPRO) 10 mg tablet Take 10 mg by mouth daily.  prenatal vit-iron fumarate-fa (RIGHT STEP PRENATAL VITAMINS) 27-0.8 mg Tab tablet Take 1 Tablet by mouth daily.  promethazine (PHENERGAN) 12.5 mg tablet Take 1 Tablet by mouth every six (6) hours as needed for Nausea (patient may take 1/2 dose if causing drowsiness.). (Patient not taking: Reported on 2022)    naproxen (NAPROSYN) 375 mg tablet Take 1 Tab by mouth two (2) times daily (with meals).  (Patient not taking: Reported on 3/25/2022)     No current facility-administered medications for this visit. Review of Systems:  History obtained from the patient and written ROS questionnaire  Constitutional: see HPI  ENT ROS: negative for - hearing change, oral lesions or visual changes  Respiratory: negative for cough, wheezing or dyspnea on exertion  Cardiovascular: negative for chest pain, irregular heart beats, exertional chest pressure/discomfort  Gastrointestinal: see HPI  Genito-Urinary ROS: , see HPI  Inteument/breast: negative for rash, breast lump and nipple discharge  Musculoskeletal:negative for stiff joints, neck pain and muscle weakness  Endocrine ROS: negative for - breast changes, galactorrhea or temperature intolerance  Hematological and Lymphatic ROS: negative for - blood clots, bruising or swollen lymph nodes    Physical Exam:  Visit Vitals  /83   Pulse 87   Ht 5' 7\" (1.702 m)   Wt 155 lb 6.4 oz (70.5 kg)   BMI 24.34 kg/m²       GENERAL: alert, well appearing, and in no distress  HEAD; normocephalic, atraumatic  PULM: clear to auscultation, no wheezes, rales or rhonchi, symmetric air entry   COR: normal rate and regular rhythm, S1 and S2 normal   ABDOMEN: soft, nontender, nondistended, no masses or organomegaly   BACK: normal range of motion, no tenderness, no CVAT   NEURO: alert, oriented, normal speech    See PN flowsheet for additional notes and exam    Assessment:  32 y.o.  21w4d   Encounter Diagnoses   Name Primary?  H/O:  Yes    Prenatal care of multigravida, antepartum    probable CHTN  Ho cs    Plan:  An evaluation of this patient's concern is planned. The patient is advised that she should contact the office if she does not note improvement or if symptoms recur  She should contact our office with any questions or concerns  She could keep her routine OB appointment.    Disc discomforts of pregnacy rec to ER for CP/SOB if persistent/severe  Mood precautions: SI/HI precautions, strongly recommend counseling: resources given  Disc options for NV in pregnancy  Disc importance of good mood control during and after pregnancy  SI/HI precautions: rec to ER if sx recur and disc managing home stress. Fall precautions, disc BP/HR changes in pregnancy. No orders of the defined types were placed in this encounter. No results found for this visit on 06/22/22.     Pilar Villa MD

## 2022-07-11 ENCOUNTER — HOSPITAL ENCOUNTER (OUTPATIENT)
Dept: PERINATAL CARE | Age: 32
Discharge: HOME | End: 2022-07-11
Attending: OBSTETRICS & GYNECOLOGY
Payer: MEDICAID

## 2022-07-11 PROCEDURE — 76816 OB US FOLLOW-UP PER FETUS: CPT | Performed by: OBSTETRICS & GYNECOLOGY

## 2022-07-29 ENCOUNTER — PATIENT MESSAGE (OUTPATIENT)
Dept: OBGYN CLINIC | Age: 32
End: 2022-07-29

## 2022-08-03 ENCOUNTER — ROUTINE PRENATAL (OUTPATIENT)
Dept: OBGYN CLINIC | Age: 32
End: 2022-08-03
Payer: MEDICAID

## 2022-08-03 VITALS
BODY MASS INDEX: 25.4 KG/M2 | DIASTOLIC BLOOD PRESSURE: 78 MMHG | WEIGHT: 162.2 LBS | HEART RATE: 87 BPM | SYSTOLIC BLOOD PRESSURE: 138 MMHG

## 2022-08-03 DIAGNOSIS — Z98.891 H/O: C-SECTION: ICD-10-CM

## 2022-08-03 DIAGNOSIS — Z23 ENCOUNTER FOR IMMUNIZATION: Primary | ICD-10-CM

## 2022-08-03 DIAGNOSIS — Z34.80 PRENATAL CARE OF MULTIGRAVIDA, ANTEPARTUM: ICD-10-CM

## 2022-08-03 PROCEDURE — 90715 TDAP VACCINE 7 YRS/> IM: CPT | Performed by: OBSTETRICS & GYNECOLOGY

## 2022-08-03 PROCEDURE — 0502F SUBSEQUENT PRENATAL CARE: CPT | Performed by: OBSTETRICS & GYNECOLOGY

## 2022-08-03 PROCEDURE — 90471 IMMUNIZATION ADMIN: CPT | Performed by: OBSTETRICS & GYNECOLOGY

## 2022-08-03 NOTE — PATIENT INSTRUCTIONS
Vaccine Information Statement    Tdap (Tetanus, Diphtheria, Pertussis) Vaccine: What You Need to Know     Many vaccine information statements are available in [place] and other languages. See www.immunize.org/vis. Hojas de información sobre vacunas están disponibles en español y en muchos otros idiomas. Visite www.immunize.org/vis. 1. Why get vaccinated? Tdap vaccine can prevent tetanus, diphtheria, and pertussis. Diphtheria and pertussis spread from person to person. Tetanus enters the body through cuts or wounds. TETANUS (T) causes painful stiffening of the muscles. Tetanus can lead to serious health problems, including being unable to open the mouth, having trouble swallowing and breathing, or death. DIPHTHERIA (D) can lead to difficulty breathing, heart failure, paralysis, or death. PERTUSSIS (aP), also known as whooping cough, can cause uncontrollable, violent coughing that makes it hard to breathe, eat, or drink. Pertussis can be extremely serious especially in babies and young children, causing pneumonia, convulsions, brain damage, or death. In teens and adults, it can cause weight loss, loss of bladder control, passing out, and rib fractures from severe coughing. 2. Tdap vaccine     Tdap is only for children 7 years and older, adolescents, and adults. Adolescents should receive a single dose of Tdap, preferably at age 6 or 15 years. Pregnant people should get a dose of Tdap during every pregnancy, preferably during the early part of the third trimester, to help protect the  from pertussis. Infants are most at risk for severe, life-threatening complications from pertussis. Adults who have never received Tdap should get a dose of Tdap.       Also, adults should receive a booster dose of either Tdap or Td (a different vaccine that protects against tetanus and diphtheria but not pertussis) every 10 years, or after 5 years in the case of a severe or dirty wound or burn.     Tdap may be given at the same time as other vaccines. 3. Talk with your health care provider    Tell your vaccination provider if the person getting the vaccine:  Has had an allergic reaction after a previous dose of any vaccine that protects against tetanus, diphtheria, or pertussis, or has any severe, life-threatening allergies   Has had a coma, decreased level of consciousness, or prolonged seizures within 7 days after a previous dose of any pertussis vaccine (DTP, DTaP, or Tdap)  Has seizures or another nervous system problem  Has ever had Guillain-Barré Syndrome (also called GBS)  Has had severe pain or swelling after a previous dose of any vaccine that protects against tetanus or diphtheria    In some cases, your health care provider may decide to postpone Tdap vaccination until a future visit. People with minor illnesses, such as a cold, may be vaccinated. People who are moderately or severely ill should usually wait until they recover before getting Tdap vaccine. Your health care provider can give you more information. 4. Risks of a vaccine reaction    Pain, redness, or swelling where the shot was given, mild fever, headache, feeling tired, and nausea, vomiting, diarrhea, or stomachache sometimes happen after Tdap vaccination. People sometimes faint after medical procedures, including vaccination. Tell your provider if you feel dizzy or have vision changes or ringing in the ears. As with any medicine, there is a very remote chance of a vaccine causing a severe allergic reaction, other serious injury, or death. 5. What if there is a serious problem? An allergic reaction could occur after the vaccinated person leaves the clinic.  If you see signs of a severe allergic reaction (hives, swelling of the face and throat, difficulty breathing, a fast heartbeat, dizziness, or weakness), call 9-1-1 and get the person to the nearest hospital.    For other signs that concern you, call your health care provider. Adverse reactions should be reported to the Vaccine Adverse Event Reporting System (VAERS). Your health care provider will usually file this report, or you can do it yourself. Visit the VAERS website at www.vaers. [facility].gov or call 2-100.742.9413. VAERS is only for reporting reactions, and VAERS staff members do not give medical advice. 6. The National Vaccine Injury Compensation Program    The [facility] Vaccine Injury Compensation Program (VICP) is a federal program that was created to compensate people who may have been injured by certain vaccines. Claims regarding alleged injury or death due to vaccination have a time limit for filing, which may be as short as two years. Visit the VICP website at www.[facility].gov/vaccinecompensation or call 2-873.775.9796 to learn about the program and about filing a claim. 7. How can I learn more? Ask your health care provider. Call your local or state health department. Visit the website of the Food and Drug Administration (FDA) for vaccine package inserts and additional information at www.fda.gov/vaccines-blood-biologics/vaccines. Contact the Centers for Disease Control and Prevention (CDC): Call 2-731.501.5630 (1-800-CDC-INFO) or  Visit CDCs website at www.cdc.gov/vaccines. Vaccine Information Statement   Tdap (Tetanus, Diphtheria, Pertussis) Vaccine  8/6/2021  [id number]   Department of Health and Human Services  Centers for Disease Control and Prevention    Office Use Only

## 2022-08-03 NOTE — PROGRESS NOTES
_ [facility]  [email or web address]  [phone]    Paxton Rose MD, FACOG     Follow-up OB visit    Chief Complaint   Patient presents with    Routine Prenatal Visit       Patient Active Problem List    Diagnosis Date Noted    H/O:  2022    Prenatal care of multigravida, antepartum 2022    Depression affecting pregnancy 2022    Gestational hypertension 2012    Missed  2011          The patient reports the following concerns: Patient complaining of cramping that has been going on for a month. Vitals:    22 1042   BP: 138/78   Pulse: 87   Weight: 162 lb 3.2 oz (73.6 kg)     See PN flowsheet for exam    32 y.o.  27w4d   Encounter Diagnoses   Name Primary?     Encounter for immunization Yes    Prenatal care of multigravida, antepartum     H/O:       Disc options for delivery, this is last planned baby, desires repeat cs  Tdap     [] SAB/bleeding precautions reviewed   [x] PTL/PPROM precautions reviewed   [] Labor precautions reviewed   [] Fetal kick counts discussed   [] Labs reviewed with patient   [] Gualberto Singhon precautions reviewed   [x] Consent reviewed   [] Handouts given to pt   [] Glucola handout    [] GBS/labor/Magic Hour handout   []    [] We reviewed CDC recommendations for Tdap for patient and close contacts and RBA of receiving in pregnancy, advised obtaining in third trimester   [] Reviewed healthy nutrition in pregnancy and good exercise practices   [] We disc safer medications in pregnancy and referred patient to [facility] resources   [] We reviewed CDC recommendations for flu vaccine and RBA of receiving in pregnancy   []    []    []           Orders Placed This Encounter    Tetanus, diphtheria toxoids and acellular pertussis (TDAP) vaccine, in individuals >=7 years, IM    GLUCOSE, GESTATIONAL 1 HR TOLERANCE    CBC W/O DIFF    ID IMMUNIZ ADMIN,1 SINGLE/COMB VAC/TOXOID       Paxton Rose MD

## 2022-08-03 NOTE — PROGRESS NOTES
Sabina Flores is a 32 y.o. female who presents for TDAP immunization per verbal order from Magda Peralta MD.  She denies any symptoms , reactions or allergies that would exclude them from being immunized today. Risks and adverse reactions were discussed and the VIS was given to her. All questions were addressed. She was observed for 15 min post injection. There were no reactions observed.

## 2022-08-04 LAB
ERYTHROCYTE [DISTWIDTH] IN BLOOD BY AUTOMATED COUNT: 11.8 % (ref 11.7–15.4)
GLUCOSE 1H P 50 G GLC PO SERPL-MCNC: 143 MG/DL (ref 65–139)
HCT VFR BLD AUTO: 30.2 % (ref 34–46.6)
HGB BLD-MCNC: 10.7 G/DL (ref 11.1–15.9)
MCH RBC QN AUTO: 34.1 PG (ref 26.6–33)
MCHC RBC AUTO-ENTMCNC: 35.4 G/DL (ref 31.5–35.7)
MCV RBC AUTO: 96 FL (ref 79–97)
PLATELET # BLD AUTO: 209 X10E3/UL (ref 150–450)
RBC # BLD AUTO: 3.14 X10E6/UL (ref 3.77–5.28)
WBC # BLD AUTO: 7.3 X10E3/UL (ref 3.4–10.8)

## 2022-08-05 NOTE — PROGRESS NOTES
Update PNL  Add abnl 1 hr glucola to PL  Rec 3hr gtt asap/within one week, do not wait until next FOB visit add iron panel and ferritin to lab  Notify pt if [employer] message not read or not active or 3hr not done within one week

## 2022-08-11 ENCOUNTER — LAB ONLY (OUTPATIENT)
Dept: OBGYN CLINIC | Age: 32
End: 2022-08-11

## 2022-08-11 DIAGNOSIS — Z34.80 PRENATAL CARE OF MULTIGRAVIDA, ANTEPARTUM: ICD-10-CM

## 2022-08-11 DIAGNOSIS — O99.019 ANEMIA OF MOTHER IN PREGNANCY, ANTEPARTUM: ICD-10-CM

## 2022-08-11 DIAGNOSIS — Z91.89 AT RISK FOR GESTATIONAL DIABETES MELLITUS: ICD-10-CM

## 2022-08-11 DIAGNOSIS — R73.09 ELEVATED GLUCOSE TOLERANCE TEST: Primary | ICD-10-CM

## 2022-08-11 LAB
GTT 120 MIN, EXTERNAL: 103
GTT 180 MIN, EXTERNAL: 48
GTT 60 MIN, EXTERNAL: 145
GTT, FASTING, EXTERNAL: 94

## 2022-08-12 LAB
FERRITIN SERPL-MCNC: 11 NG/ML (ref 15–150)
GLUCOSE 1H P 100 G GLC PO SERPL-MCNC: 145 MG/DL (ref 65–179)
GLUCOSE 2H P 100 G GLC PO SERPL-MCNC: 103 MG/DL (ref 65–154)
GLUCOSE 3H P 100 G GLC PO SERPL-MCNC: 48 MG/DL (ref 65–139)
GLUCOSE P FAST SERPL-MCNC: 94 MG/DL (ref 65–94)
IRON SATN MFR SERPL: 12 % (ref 15–55)
IRON SERPL-MCNC: 54 UG/DL (ref 27–159)
NOTE:, 102047: ABNORMAL
TIBC SERPL-MCNC: 453 UG/DL (ref 250–450)
UIBC SERPL-MCNC: 399 UG/DL (ref 131–425)

## 2022-08-15 NOTE — PROGRESS NOTES
3hr x anemia  Add ferritin # w date  Add to PN Labs. Notify pt if [employer] message not read or not active. Add #.# hgb (g/dL) to pregnancy  problem list   Recommend iron per protocol. Pt should start, or add, an over the counter elemental iron supplement of 45-65 mg. Consider 'Slow FE' or ferrous sulfate 325 mg once a day or twice a day if hgb <10   Also add vitamin C 250 mg and a daily prenatal vitamin to help anemia. Add a stool softener, like docusate or colace 100 mg (2x/day) to avoid constipation. If you do not tolerate supplements you can try blackstrap molasses (1 tbsp=27mg elemental iron).

## 2022-08-16 ENCOUNTER — TELEPHONE (OUTPATIENT)
Dept: OBGYN CLINIC | Age: 32
End: 2022-08-16

## 2022-08-16 NOTE — TELEPHONE ENCOUNTER
Follow up call to Ms. Bahena - Pt verified self and birth date for privacy precautions. Patient was advised of results. pt reported some pelvic pressure. Ms. Taina Palacios acknowledged understanding and all questions were answered to patients satisfaction. No further questions or concerns at this time.

## 2022-08-16 NOTE — TELEPHONE ENCOUNTER
----- Message from Hector Arreola MD sent at 8/15/2022  7:47 PM EDT -----  3hr x anemia  Add ferritin # w date  Add to PN Labs. Notify pt if [employer] message not read or not active. Add #.# hgb (g/dL) to pregnancy  problem list   Recommend iron per protocol. Pt should start, or add, an over the counter elemental iron supplement of 45-65 mg. Consider 'Slow FE' or ferrous sulfate 325 mg once a day or twice a day if hgb <10   Also add vitamin C 250 mg and a daily prenatal vitamin to help anemia. Add a stool softener, like docusate or colace 100 mg (2x/day) to avoid constipation. If you do not tolerate supplements you can try blackstrap molasses (1 tbsp=27mg elemental iron).

## 2022-08-16 NOTE — PROGRESS NOTES
Follow up call to Ms. Bahena - Pt verified self and birth date for privacy precautions. Patient was advised of results. pt reported some pelvic pressure. Ms. Jackson Began acknowledged understanding and all questions were answered to patients satisfaction. No further questions or concerns at this time.          Updated PL & PNL

## 2022-08-19 ENCOUNTER — ROUTINE PRENATAL (OUTPATIENT)
Dept: OBGYN CLINIC | Age: 32
End: 2022-08-19
Payer: MEDICAID

## 2022-08-19 VITALS — WEIGHT: 164.4 LBS | BODY MASS INDEX: 25.75 KG/M2 | DIASTOLIC BLOOD PRESSURE: 78 MMHG | SYSTOLIC BLOOD PRESSURE: 132 MMHG

## 2022-08-19 DIAGNOSIS — R10.2 PELVIC PRESSURE IN PREGNANCY: ICD-10-CM

## 2022-08-19 DIAGNOSIS — O26.899 PELVIC PRESSURE IN PREGNANCY: ICD-10-CM

## 2022-08-19 DIAGNOSIS — N89.8 VAGINAL DISCHARGE DURING PREGNANCY, ANTEPARTUM: ICD-10-CM

## 2022-08-19 DIAGNOSIS — Z34.80 PRENATAL CARE OF MULTIGRAVIDA, ANTEPARTUM: ICD-10-CM

## 2022-08-19 DIAGNOSIS — N89.8 VAGINAL DISCHARGE: Primary | ICD-10-CM

## 2022-08-19 DIAGNOSIS — R03.0 ELEVATED BLOOD PRESSURE READING: ICD-10-CM

## 2022-08-19 DIAGNOSIS — O26.899 VAGINAL DISCHARGE DURING PREGNANCY, ANTEPARTUM: ICD-10-CM

## 2022-08-19 PROCEDURE — 99212 OFFICE O/P EST SF 10 MIN: CPT | Performed by: OBSTETRICS & GYNECOLOGY

## 2022-08-19 NOTE — PROGRESS NOTES
[facility]  [email or web address]  [phone]    Lyle Solorio MD, FACOG       OB/GYN: OB Problem visit    Chief Complaint:   Chief Complaint   Patient presents with    Routine Prenatal Visit       Patient Active Problem List    Diagnosis    H/O:     Prenatal care of multigravida, antepartum     Urine dip each visit d/t BP  Mood changes on lexapro 10 mg, does not want to inc 2022  Ho cs;  post plac: REPEAT  MFM FS  (labile BP, fh spina bifida: sister, James James), likely [facility]  22 MFM FS  Declines genetic screening testing 3/25/2022 , will notify office if changes her mind  HO PIH< elevated BP fist tri: ? CHTN: add cmp to next labs ur pr cr ratio on 22 done 2022 BV -flagyl   cr: 0.05,/lft    afp neg  22 lft: AST 22, ALT 27  Hemoglobin electo WNL  3/25/22 ur pr cr random ratio: 292  22 micro/cr ratio 15   Urine cx needed at  visit; not collected at EOB  -- done 22 neg  Ho migraine HA: no prepreg meds. EPDS NV if not done recently 2022 EPDS 16  tob and mj use  8/3/22 abn 1hr gtt  8/3/22 HGB: 10.7  22 3hr gtt 0/4 abn  Ferritin 22: 11  Depression lexapro Rec counseling  Home BP cuff:  r cs ? 45 w email sent      Depression affecting pregnancy     On lexapro 10, unable to wean      Gestational hypertension    Missed        History of Present Illness: The patient is a 28 y.o.  female who reports having pelvic pressure  This is a new problem. This is a routinely scheduled OB appointment. She reports the symptoms are is unchanged. Aggravating factors include none. Alleviating factors include none. She does have other concerns.     PFSH:  Past Medical History:   Diagnosis Date    Abnormal Pap smear     LGSIL-colpo    Depression affecting pregnancy 3/25/2022    Encounter for Papanicolaou smear for cervical cancer screening 2022    neg/hpv neg    Essential hypertension     blood pressure up in office today    HX OTHER MEDICAL     recurrent UTI     History reviewed. No pertinent surgical history. Family History   Problem Relation Age of Onset    Diabetes Mother     Hypertension Mother     Psychiatric Disorder Brother     Mental Retardation Brother     Heart Disease Maternal Grandmother     Hypertension Maternal Grandmother     Hypertension Maternal Grandfather     Diabetes Paternal Grandfather     Elevated Lipids Paternal Grandfather      Social History     Tobacco Use    Smoking status: Every Day     Types: Cigarettes     Last attempt to quit: 2014     Years since quittin.0    Smokeless tobacco: Never   Substance Use Topics    Alcohol use: No    Drug use: Yes     Types: Marijuana     No Known Allergies  Current Outpatient Medications   Medication Sig    Ferrous Fumarate 325 mg (106 mg iron) tab Take 1 Tablet by mouth. BABY ASPIRIN PO Take  by mouth.    escitalopram oxalate (LEXAPRO) 10 mg tablet Take 10 mg by mouth daily. prenatal vit-iron fumarate-fa 27 mg iron- 0.8 mg tab tablet Take 1 Tablet by mouth daily. magnesium 250 mg tab Take  by mouth. (Patient not taking: No sig reported)    acetaminophen (TYLENOL) 325 mg tablet Take  by mouth every four (4) hours as needed for Pain. (Patient not taking: Reported on 2022)    calcium carbonate (TUMS) 200 mg calcium (500 mg) chew Take 1 Tablet by mouth daily. (Patient not taking: No sig reported)    promethazine (PHENERGAN) 12.5 mg tablet Take 1 Tablet by mouth every six (6) hours as needed for Nausea (patient may take 1/2 dose if causing drowsiness.). (Patient not taking: No sig reported)    naproxen (NAPROSYN) 375 mg tablet Take 1 Tab by mouth two (2) times daily (with meals). (Patient not taking: No sig reported)     No current facility-administered medications for this visit.        Review of Systems:  History obtained from the patient and written ROS questionnaire  Constitutional: negative for fevers, chills and weight loss  ENT ROS: negative for - hearing change, oral lesions or visual changes  Respiratory: negative for cough, wheezing or dyspnea on exertion  Cardiovascular: negative for chest pain, irregular heart beats, exertional chest pressure/discomfort  Gastrointestinal: negative for dysphagia, nausea and vomiting  Genito-Urinary ROS: white, see HPI  Ntz neg no pooling  Inteument/breast: negative for rash, breast lump and nipple discharge  Musculoskeletal:negative for stiff joints, neck pain and muscle weakness  Endocrine ROS: negative for - breast changes, galactorrhea or temperature intolerance  Hematological and Lymphatic ROS: negative for - blood clots, bruising or swollen lymph nodes    Physical Exam:  Visit Vitals  /78   Wt 164 lb 6.4 oz (74.6 kg)   BMI 25.75 kg/m²       GENERAL: alert, well appearing, and in no distress  HEAD; normocephalic, atraumatic  PULM: clear to auscultation, no wheezes, rales or rhonchi, symmetric air entry   COR: normal rate and regular rhythm, S1 and S2 normal   ABDOMEN: soft, nontender, nondistended, no masses or organomegaly   BACK: normal range of motion, no tenderness, no CVAT   EGBUS: no lesions, no inflammation, no masses  VULVA: normal appearing vulva with no masses, tenderness or lesions  VAGINA: normal appearing vagina with normal color, no lesions, white discharge  CERVIX: normal appearing cervix without discharge or lesions, non tender  UTERUS: uterus is enlarged in size, gravid appropriate for gestational age  ADNEXA: normal adnexa in size, nontender and no masses  NEURO: alert, oriented, normal speech  FFN not sent  See PN flowsheet for additional notes and exam    Assessment:  28 y.o.  29w6d   Encounter Diagnoses   Name Primary?     Vaginal discharge Yes    Vaginal discharge during pregnancy, antepartum     Pelvic pressure in pregnancy     Elevated blood pressure reading     Prenatal care of multigravida, antepartum        Plan:  An evaluation of this patient's concern is planned. The patient is advised that she should contact the office if she does not note improvement or if symptoms recur  She should contact our office with any questions or concerns  She could keep her routine OB appointment. PIH Precautions  PIH labs  PTL precautions reviewed  Rec BP log      Orders Placed This Encounter    NUSWAB VAGINITIS PLUS    METABOLIC PANEL, COMPREHENSIVE    CBC W/O DIFF    PROTEIN/CREATININE RATIO, URINE    MICROALBUMIN, UR, RAND W/ MICROALB/CREAT RATIO       Results for orders placed or performed in visit on 90/63/89   METABOLIC PANEL, COMPREHENSIVE   Result Value Ref Range    Glucose 64 (L) 65 - 99 mg/dL    BUN 8 6 - 20 mg/dL    Creatinine 0.47 (L) 0.57 - 1.00 mg/dL    eGFR 130 >59 mL/min/1.73    BUN/Creatinine ratio 17 9 - 23    Sodium 137 134 - 144 mmol/L    Potassium 3.8 3.5 - 5.2 mmol/L    Chloride 102 96 - 106 mmol/L    CO2 21 20 - 29 mmol/L    Calcium 9.6 8.7 - 10.2 mg/dL    Protein, total 6.5 6.0 - 8.5 g/dL    Albumin 3.9 3.8 - 4.8 g/dL    GLOBULIN, TOTAL 2.6 1.5 - 4.5 g/dL    A-G Ratio 1.5 1.2 - 2.2    Bilirubin, total <0.2 0.0 - 1.2 mg/dL    Alk.  phosphatase 79 44 - 121 IU/L    AST (SGOT) 16 0 - 40 IU/L    ALT (SGPT) 12 0 - 32 IU/L    Narrative    Performed at:  [address] [facility] [employer] [address]  [id number]  : Dominic Puckett MD, Phone:  [phone]   CBC W/O DIFF   Result Value Ref Range    WBC 8.7 3.4 - 10.8 x10E3/uL    RBC 3.40 (L) 3.77 - 5.28 x10E6/uL    HGB 10.8 (L) 11.1 - 15.9 g/dL    HCT 32.0 (L) 34.0 - 46.6 %    MCV 94 79 - 97 fL    MCH 31.8 26.6 - 33.0 pg    MCHC 33.8 31.5 - 35.7 g/dL    RDW 11.7 11.7 - 15.4 %    PLATELET 672 040 - 411 x10E3/uL    Narrative    Performed at:  [address] [employer]  [address]  [id number]  : Dominic Puckett MD, Phone:  [phone]   MICROALBUMIN, UR, RAND W/ MICROALB/CREAT RATIO   Result Value Ref Range    Creatinine, urine 118.2 Not Estab. mg/dL Microalbumin, urine 21.2 Not Estab. ug/mL    Microalb/Creat ratio (ug/mg creat.) 18 0 - 29 mg/g creat    Narrative    Performed at:  [address] [employer]  [address]  [id number]  : Zhang Carrizales MD, Phone:  [phone]       Safia Patricio MD

## 2022-08-20 LAB
ALBUMIN SERPL-MCNC: 3.9 G/DL (ref 3.8–4.8)
ALBUMIN/GLOB SERPL: 1.5 {RATIO} (ref 1.2–2.2)
ALP SERPL-CCNC: 79 IU/L (ref 44–121)
ALT SERPL-CCNC: 12 IU/L (ref 0–32)
AST SERPL-CCNC: 16 IU/L (ref 0–40)
BILIRUB SERPL-MCNC: <0.2 MG/DL (ref 0–1.2)
BUN SERPL-MCNC: 8 MG/DL (ref 6–20)
BUN/CREAT SERPL: 17 (ref 9–23)
CALCIUM SERPL-MCNC: 9.6 MG/DL (ref 8.7–10.2)
CHLORIDE SERPL-SCNC: 102 MMOL/L (ref 96–106)
CO2 SERPL-SCNC: 21 MMOL/L (ref 20–29)
CREAT SERPL-MCNC: 0.47 MG/DL (ref 0.57–1)
EGFR: 130 ML/MIN/1.73
ERYTHROCYTE [DISTWIDTH] IN BLOOD BY AUTOMATED COUNT: 11.7 % (ref 11.7–15.4)
GLOBULIN SER CALC-MCNC: 2.6 G/DL (ref 1.5–4.5)
GLUCOSE SERPL-MCNC: 64 MG/DL (ref 65–99)
HCT VFR BLD AUTO: 32 % (ref 34–46.6)
HGB BLD-MCNC: 10.8 G/DL (ref 11.1–15.9)
MCH RBC QN AUTO: 31.8 PG (ref 26.6–33)
MCHC RBC AUTO-ENTMCNC: 33.8 G/DL (ref 31.5–35.7)
MCV RBC AUTO: 94 FL (ref 79–97)
PLATELET # BLD AUTO: 241 X10E3/UL (ref 150–450)
POTASSIUM SERPL-SCNC: 3.8 MMOL/L (ref 3.5–5.2)
PROT SERPL-MCNC: 6.5 G/DL (ref 6–8.5)
RBC # BLD AUTO: 3.4 X10E6/UL (ref 3.77–5.28)
SODIUM SERPL-SCNC: 137 MMOL/L (ref 134–144)
WBC # BLD AUTO: 8.7 X10E3/UL (ref 3.4–10.8)

## 2022-08-21 LAB
ALBUMIN/CREAT UR: 18 MG/G CREAT (ref 0–29)
CREAT UR-MCNC: 118.2 MG/DL
MICROALBUMIN UR-MCNC: 21.2 UG/ML

## 2022-08-21 NOTE — PROGRESS NOTES
PIH labs OK except anemia. Add to PN Labs. Notify pt if [employer] message not read or not active. Add #.# hgb (g/dL) to pregnancy  problem list   Recommend iron per protocol. Pt should start, or add, an over the counter elemental iron supplement of 45-65 mg. Consider 'Slow FE' or ferrous sulfate 325 mg once a day or twice a day if hgb <10   Also add vitamin C 250 mg and a daily prenatal vitamin to help anemia. Add a stool softener, like docusate or colace 100 mg (2x/day) to avoid constipation. If you do not tolerate supplements you can try blackstrap molasses (1 tbsp=27mg elemental iron).   Add PIH labs to PL (hgb,plat,cr, ast, alt, ur: prot 24 hr total or ratio or equivalent) w date

## 2022-08-23 LAB
A VAGINAE DNA VAG QL NAA+PROBE: ABNORMAL SCORE
BVAB2 DNA VAG QL NAA+PROBE: ABNORMAL SCORE
C ALBICANS DNA VAG QL NAA+PROBE: POSITIVE
C GLABRATA DNA VAG QL NAA+PROBE: NEGATIVE
C TRACH DNA VAG QL NAA+PROBE: NEGATIVE
MEGA1 DNA VAG QL NAA+PROBE: ABNORMAL SCORE
N GONORRHOEA DNA VAG QL NAA+PROBE: NEGATIVE
T VAGINALIS DNA VAG QL NAA+PROBE: NEGATIVE

## 2022-08-25 NOTE — PROGRESS NOTES
Abnormal results. Notify pt if [employer] message not read or not active. Consistent with a yeast infection. If having symptoms   Rec over the counter 3d or 7d miconazole vaginal treatment (like Monistat)  RTC if NI after 7 days.     30w4d

## 2022-09-01 ENCOUNTER — ROUTINE PRENATAL (OUTPATIENT)
Dept: OBGYN CLINIC | Age: 32
End: 2022-09-01
Payer: MEDICAID

## 2022-09-01 VITALS — DIASTOLIC BLOOD PRESSURE: 75 MMHG | BODY MASS INDEX: 26.09 KG/M2 | SYSTOLIC BLOOD PRESSURE: 133 MMHG | WEIGHT: 166.6 LBS

## 2022-09-01 DIAGNOSIS — O16.3 ELEVATED BLOOD PRESSURE AFFECTING PREGNANCY IN THIRD TRIMESTER, ANTEPARTUM: Primary | ICD-10-CM

## 2022-09-01 DIAGNOSIS — Z34.80 PRENATAL CARE OF MULTIGRAVIDA, ANTEPARTUM: ICD-10-CM

## 2022-09-01 DIAGNOSIS — Z98.891 H/O: C-SECTION: ICD-10-CM

## 2022-09-01 DIAGNOSIS — Z3A.31 31 WEEKS GESTATION OF PREGNANCY: ICD-10-CM

## 2022-09-01 PROCEDURE — 0502F SUBSEQUENT PRENATAL CARE: CPT | Performed by: OBSTETRICS & GYNECOLOGY

## 2022-09-01 NOTE — PROGRESS NOTES
Urine dumped; patient declined leaving additional urine.  Will collect protein/creatnine at next visit

## 2022-09-01 NOTE — PROGRESS NOTES
[facility]  [email or web address]  [phone]    Gina Low MD, FACOG       OB/GYN: OB Problem visit    Chief Complaint:   Chief Complaint   Patient presents with    Routine Prenatal Visit    Dizziness    Nausea    Pelvic Pain       Patient Active Problem List    Diagnosis    H/O:     Prenatal care of multigravida, antepartum     Urine dip each visit d/t BP  Mood changes on lexapro 10 mg, does not want to inc 2022  Ho cs;  post plac: REPEAT  MFM FS  (labile BP, fh spina bifida: sister, Mallie Jeans), likely [facility]  22 MFM FS  Declines genetic screening testing 3/25/2022 , will notify office if changes her mind  HO PIH< elevated BP fist tri: ? CHTN: add cmp to next labs ur pr cr ratio on 22 done 2022 BV -flagyl   cr: 0.05,/lft    afp neg  22 lft: AST 22, ALT 27  Hemoglobin electo WNL  3/25/22 ur pr cr random ratio: 292  22 micro/cr ratio 15   Urine cx needed at  visit; not collected at EOB  -- done 22 neg  Ho migraine HA: no prepreg meds. EPDS NV if not done recently 2022 EPDS 16  tob and mj use  8/3/22 abn 1hr gtt  8/3/22 HGB: 10.7  22 3hr gtt 0/4 abn  Ferritin 22: 11  Depression lexapro Rec counseling  Home BP cuff:  r cs ? 45 w email sent  Repeat C/S [email or web address]      Depression affecting pregnancy     On lexapro 10, unable to wean      Gestational hypertension    Missed        History of Present Illness: The patient is a 28 y.o.  female who reports having lightheadedness, pelvic pain, nausea/vomiting, vaginal discharge for a few weeks    This is not a new problem  This is a routinely scheduled OB appointment. She reports the symptoms are is unchanged. Aggravating factors include none. Alleviating factors include none. Migraine ha on and off for most of pregnancy. She does have other concerns.     PFSH:  Past Medical History:   Diagnosis Date    Abnormal Pap smear LGSIL-colpo    Depression affecting pregnancy 3/25/2022    Encounter for Papanicolaou smear for cervical cancer screening 2022    neg/hpv neg    Essential hypertension     blood pressure up in office today    HX OTHER MEDICAL     recurrent UTI     History reviewed. No pertinent surgical history. Family History   Problem Relation Age of Onset    Diabetes Mother     Hypertension Mother     Psychiatric Disorder Brother     Mental Retardation Brother     Heart Disease Maternal Grandmother     Hypertension Maternal Grandmother     Hypertension Maternal Grandfather     Diabetes Paternal Grandfather     Elevated Lipids Paternal Grandfather      Social History     Tobacco Use    Smoking status: Every Day     Types: Cigarettes     Last attempt to quit: 2014     Years since quittin.1    Smokeless tobacco: Never   Substance Use Topics    Alcohol use: No    Drug use: Yes     Types: Marijuana     No Known Allergies  Current Outpatient Medications   Medication Sig    escitalopram oxalate (LEXAPRO) 10 mg tablet Take 5 mg by mouth daily. prenatal vit-iron fumarate-fa 27 mg iron- 0.8 mg tab tablet Take 1 Tablet by mouth daily. Ferrous Fumarate 325 mg (106 mg iron) tab Take 1 Tablet by mouth. (Patient not taking: Reported on 2022)    BABY ASPIRIN PO Take  by mouth. (Patient not taking: Reported on 2022)    magnesium 250 mg tab Take  by mouth. (Patient not taking: No sig reported)    acetaminophen (TYLENOL) 325 mg tablet Take  by mouth every four (4) hours as needed for Pain. (Patient not taking: No sig reported)    calcium carbonate (TUMS) 200 mg calcium (500 mg) chew Take 1 Tablet by mouth daily. (Patient not taking: No sig reported)    promethazine (PHENERGAN) 12.5 mg tablet Take 1 Tablet by mouth every six (6) hours as needed for Nausea (patient may take 1/2 dose if causing drowsiness.).  (Patient not taking: No sig reported)    naproxen (NAPROSYN) 375 mg tablet Take 1 Tab by mouth two (2) times daily (with meals). (Patient not taking: No sig reported)     No current facility-administered medications for this visit. Review of Systems:  History obtained from the patient and written ROS questionnaire  Constitutional: negative for fevers, chills and weight loss  ENT ROS: negative for - hearing change, oral lesions or visual changes  Respiratory: negative for cough, wheezing or dyspnea on exertion  Cardiovascular: negative for chest pain, irregular heart beats, exertional chest pressure/discomfort  Gastrointestinal: see HPI  Genito-Urinary ROS: , see HPI  Inteument/breast: negative for rash, breast lump and nipple discharge  Musculoskeletal:negative for stiff joints, neck pain and muscle weakness  Endocrine ROS: negative for - breast changes, galactorrhea or temperature intolerance  Hematological and Lymphatic ROS: negative for - blood clots, bruising or swollen lymph nodes    Physical Exam:  Visit Vitals  /75   Wt 166 lb 9.6 oz (75.6 kg)   BMI 26.09 kg/m²       GENERAL: alert, well appearing, and in no distress  HEAD; normocephalic, atraumatic  PULM: clear to auscultation, no wheezes, rales or rhonchi, symmetric air entry   COR: normal rate and regular rhythm, S1 and S2 normal   ABDOMEN: soft, nontender, nondistended, no masses or organomegaly   BACK: normal range of motion, no tenderness, no CVAT   EGBUS: no lesions, no inflammation, no masses  VULVA: normal appearing vulva with no masses, tenderness or lesions  VAGINA: normal appearing vagina with normal color, no lesions, white discharge  CERVIX: normal appearing cervix without discharge or lesions, non tender  UTERUS: uterus is enlarged in size, gravid appropriate for gestational age  ADNEXA: normal adnexa in size, nontender and no masses  NEURO: alert, oriented, normal speech    See PN flowsheet for additional notes and exam    Assessment:  28 y.o.  31w5d   Encounter Diagnoses   Name Primary?     Elevated blood pressure affecting pregnancy in third trimester, antepartum Yes    H/O:      32 weeks gestation of pregnancy     Prenatal care of multigravida, antepartum        Plan:  An evaluation of this patient's concern is planned. The patient is advised that she should contact the office if she does not note improvement or if symptoms recur  She should contact our office with any questions or concerns  She could keep her routine OB appointment. PIH precautions  Rec bp log: pls send to MD/OSMAN  RS MFM fu: rec fu q 4 wks  PIH labs  Disc inpt vs outpt evluation: will do labs and MFM fu but strict pih precautions given and rec contact MD or to LD prn  Small frequent meals  Fall precautions          Orders Placed This Encounter    CBC W/O DIFF    METABOLIC PANEL, COMPREHENSIVE    PROTEIN/CREATININE RATIO, URINE       No results found for this visit on 22.     Deb Machado MD

## 2022-09-02 LAB
ALBUMIN SERPL-MCNC: 3.9 G/DL (ref 3.8–4.8)
ALBUMIN/GLOB SERPL: 1.8 {RATIO} (ref 1.2–2.2)
ALP SERPL-CCNC: 79 IU/L (ref 44–121)
ALT SERPL-CCNC: 10 IU/L (ref 0–32)
AST SERPL-CCNC: 12 IU/L (ref 0–40)
BILIRUB SERPL-MCNC: 0.2 MG/DL (ref 0–1.2)
BUN SERPL-MCNC: 7 MG/DL (ref 6–20)
BUN/CREAT SERPL: 15 (ref 9–23)
CALCIUM SERPL-MCNC: 9 MG/DL (ref 8.7–10.2)
CHLORIDE SERPL-SCNC: 103 MMOL/L (ref 96–106)
CO2 SERPL-SCNC: 18 MMOL/L (ref 20–29)
CREAT SERPL-MCNC: 0.47 MG/DL (ref 0.57–1)
CREAT UR-MCNC: 166.4 MG/DL
EGFR: 130 ML/MIN/1.73
ERYTHROCYTE [DISTWIDTH] IN BLOOD BY AUTOMATED COUNT: 12.4 % (ref 11.7–15.4)
GLOBULIN SER CALC-MCNC: 2.2 G/DL (ref 1.5–4.5)
GLUCOSE SERPL-MCNC: 81 MG/DL (ref 65–99)
HCT VFR BLD AUTO: 29.9 % (ref 34–46.6)
HGB BLD-MCNC: 10.8 G/DL (ref 11.1–15.9)
MCH RBC QN AUTO: 33.8 PG (ref 26.6–33)
MCHC RBC AUTO-ENTMCNC: 36.1 G/DL (ref 31.5–35.7)
MCV RBC AUTO: 93 FL (ref 79–97)
PLATELET # BLD AUTO: 212 X10E3/UL (ref 150–450)
POTASSIUM SERPL-SCNC: 3.9 MMOL/L (ref 3.5–5.2)
PROT SERPL-MCNC: 6.1 G/DL (ref 6–8.5)
PROT UR-MCNC: 30.5 MG/DL
PROT/CREAT UR: 183 MG/G CREAT (ref 0–200)
RBC # BLD AUTO: 3.2 X10E6/UL (ref 3.77–5.28)
SODIUM SERPL-SCNC: 138 MMOL/L (ref 134–144)
WBC # BLD AUTO: 7.4 X10E3/UL (ref 3.4–10.8)

## 2022-09-06 NOTE — PROGRESS NOTES
Normal results, add to prenatal records. We can review in detail with patient at next visit. [facility] message sent if active.   Mild anemia, stable

## 2022-09-13 ENCOUNTER — HOSPITAL ENCOUNTER (OUTPATIENT)
Dept: PERINATAL CARE | Age: 32
Discharge: HOME | End: 2022-09-13
Attending: OBSTETRICS & GYNECOLOGY
Payer: MEDICAID

## 2022-09-13 PROCEDURE — 76819 FETAL BIOPHYS PROFIL W/O NST: CPT | Performed by: OBSTETRICS & GYNECOLOGY

## 2022-09-13 PROCEDURE — 76816 OB US FOLLOW-UP PER FETUS: CPT | Performed by: OBSTETRICS & GYNECOLOGY

## 2022-09-15 ENCOUNTER — ROUTINE PRENATAL (OUTPATIENT)
Dept: OBGYN CLINIC | Age: 32
End: 2022-09-15
Payer: MEDICAID

## 2022-09-15 VITALS
BODY MASS INDEX: 26.06 KG/M2 | WEIGHT: 166 LBS | HEIGHT: 67 IN | DIASTOLIC BLOOD PRESSURE: 90 MMHG | SYSTOLIC BLOOD PRESSURE: 123 MMHG

## 2022-09-15 DIAGNOSIS — O16.3 ELEVATED BLOOD PRESSURE AFFECTING PREGNANCY IN THIRD TRIMESTER, ANTEPARTUM: ICD-10-CM

## 2022-09-15 DIAGNOSIS — Z34.80 PRENATAL CARE OF MULTIGRAVIDA, ANTEPARTUM: ICD-10-CM

## 2022-09-15 DIAGNOSIS — Z98.891 H/O: C-SECTION: Primary | ICD-10-CM

## 2022-09-15 PROCEDURE — 0502F SUBSEQUENT PRENATAL CARE: CPT | Performed by: OBSTETRICS & GYNECOLOGY

## 2022-09-15 RX ORDER — PROMETHAZINE HYDROCHLORIDE 12.5 MG/1
12.5 TABLET ORAL
Qty: 30 TABLET | Refills: 2 | Status: SHIPPED | OUTPATIENT
Start: 2022-09-15 | End: 2022-10-15

## 2022-09-15 RX ORDER — DOXYLAMINE SUCCINATE AND PYRIDOXINE HYDROCHLORIDE 20; 20 MG/1; MG/1
1 TABLET, EXTENDED RELEASE ORAL
Qty: 60 TABLET | Refills: 3 | Status: SHIPPED | OUTPATIENT
Start: 2022-09-15 | End: 2022-09-16

## 2022-09-15 NOTE — PROGRESS NOTES
_ [facility]  [email or web address]  [phone]    Cherelle Morrison MD, FACOG     Follow-up OB visit    Chief Complaint   Patient presents with    Routine Prenatal Visit       Patient Active Problem List    Diagnosis Date Noted    H/O:  2022    Prenatal care of multigravida, antepartum 2022    Depression affecting pregnancy 2022    Gestational hypertension 2012    Missed  2011          The patient reports the following concerns: pt reports PCP is trying to wean her off Lexapro. Pt was on 10mg, now on 5mg (took for 1 week, now taking every other day), started new dose 22. Pt reports withdraw sx, she reports her mind just doesn't feel right. Pt going to take pregnancy pictures later today & has baby shower this weekend. Emesis 3x/day  Other MD: weaning lexapro, pt not tolerating well    Vitals:    09/15/22 0943 09/15/22 0944 09/15/22 0946   BP: (!) 158/88 (!) 159/89 (!) 123/90   Weight:   166 lb (75.3 kg)   Height:   5' 7\" (1.702 m)     See PN flowsheet for exam    28 y.o.  33w5d   Encounter Diagnoses   Name Primary? H/O:  Yes    Elevated blood pressure affecting pregnancy in third trimester, antepartum     Prenatal care of multigravida, antepartum      Disc labile BP and recommendation for med to manage: pt declines  Disc options for nv: rf phenergan: pt not taking and add bonjesta (change to diclegis if not covered)  BP log, pt will notify MD if remains elevated at home  Disc rba of SSRI/SRNI in pregnancy, can continue lexapro if needed for sx control.        [] SAB/bleeding precautions reviewed   [] PTL/PPROM precautions reviewed   [] Labor precautions reviewed   [] Fetal kick counts discussed   [] Labs reviewed with patient   [] Fiona Avery precautions reviewed   [] Consent reviewed   [] Handouts given to pt   [] Glucola handout    [] GBS/labor/Magic Hour handout   []    [] We reviewed CDC recommendations for Tdap for patient and close contacts and RBA of receiving in pregnancy, advised obtaining in third trimester   [] Reviewed healthy nutrition in pregnancy and good exercise practices   [] We disc safer medications in pregnancy and referred patient to [facility] resources   [] We reviewed CDC recommendations for flu vaccine and RBA of receiving in pregnancy   []    []    []           Orders Placed This Encounter    CBC W/O DIFF    METABOLIC PANEL, COMPREHENSIVE    PROTEIN/CREATININE RATIO, URINE    doxylamine-pyridoxine, vit B6, (Bonjesta) 20-20 mg TbID    promethazine (PHENERGAN) 12.5 mg tablet       Héctor Sin MD

## 2022-09-16 LAB
ALBUMIN SERPL-MCNC: 4 G/DL (ref 3.8–4.8)
ALBUMIN/GLOB SERPL: 1.6 {RATIO} (ref 1.2–2.2)
ALP SERPL-CCNC: 111 IU/L (ref 44–121)
ALT SERPL-CCNC: 11 IU/L (ref 0–32)
AST SERPL-CCNC: 16 IU/L (ref 0–40)
BILIRUB SERPL-MCNC: 0.3 MG/DL (ref 0–1.2)
BUN SERPL-MCNC: 8 MG/DL (ref 6–20)
BUN/CREAT SERPL: 15 (ref 9–23)
CALCIUM SERPL-MCNC: 9.1 MG/DL (ref 8.7–10.2)
CHLORIDE SERPL-SCNC: 99 MMOL/L (ref 96–106)
CO2 SERPL-SCNC: 21 MMOL/L (ref 20–29)
CREAT SERPL-MCNC: 0.55 MG/DL (ref 0.57–1)
EGFR: 125 ML/MIN/1.73
ERYTHROCYTE [DISTWIDTH] IN BLOOD BY AUTOMATED COUNT: 12.4 % (ref 11.7–15.4)
GLOBULIN SER CALC-MCNC: 2.5 G/DL (ref 1.5–4.5)
GLUCOSE SERPL-MCNC: 74 MG/DL (ref 65–99)
HCT VFR BLD AUTO: 33.8 % (ref 34–46.6)
HGB BLD-MCNC: 11.9 G/DL (ref 11.1–15.9)
MCH RBC QN AUTO: 32.5 PG (ref 26.6–33)
MCHC RBC AUTO-ENTMCNC: 35.2 G/DL (ref 31.5–35.7)
MCV RBC AUTO: 92 FL (ref 79–97)
PLATELET # BLD AUTO: 213 X10E3/UL (ref 150–450)
POTASSIUM SERPL-SCNC: 3.9 MMOL/L (ref 3.5–5.2)
PROT SERPL-MCNC: 6.5 G/DL (ref 6–8.5)
RBC # BLD AUTO: 3.66 X10E6/UL (ref 3.77–5.28)
SODIUM SERPL-SCNC: 135 MMOL/L (ref 134–144)
WBC # BLD AUTO: 7.5 X10E3/UL (ref 3.4–10.8)

## 2022-09-16 NOTE — PROGRESS NOTES
Normal results, add to prenatal records. We can review in detail with patient at next visit. [facility] message sent if active.

## 2022-09-17 LAB
CREAT UR-MCNC: 100.5 MG/DL
PROT UR-MCNC: 35.1 MG/DL
PROT/CREAT UR: 349 MG/G CREAT (ref 0–200)

## 2022-09-23 ENCOUNTER — HOSPITAL ENCOUNTER (OUTPATIENT)
Dept: PERINATAL CARE | Age: 32
Discharge: HOME | End: 2022-09-23
Attending: OBSTETRICS & GYNECOLOGY
Payer: MEDICAID

## 2022-09-23 PROCEDURE — 76819 FETAL BIOPHYS PROFIL W/O NST: CPT | Performed by: OBSTETRICS & GYNECOLOGY

## 2022-09-28 ENCOUNTER — ROUTINE PRENATAL (OUTPATIENT)
Dept: OBGYN CLINIC | Age: 32
End: 2022-09-28
Payer: MEDICAID

## 2022-09-28 VITALS
BODY MASS INDEX: 26.4 KG/M2 | HEIGHT: 67 IN | HEART RATE: 101 BPM | SYSTOLIC BLOOD PRESSURE: 133 MMHG | WEIGHT: 168.2 LBS | DIASTOLIC BLOOD PRESSURE: 88 MMHG

## 2022-09-28 DIAGNOSIS — O16.3 ELEVATED BLOOD PRESSURE AFFECTING PREGNANCY IN THIRD TRIMESTER, ANTEPARTUM: ICD-10-CM

## 2022-09-28 DIAGNOSIS — Z34.80 PRENATAL CARE OF MULTIGRAVIDA, ANTEPARTUM: Primary | ICD-10-CM

## 2022-09-28 PROCEDURE — 0502F SUBSEQUENT PRENATAL CARE: CPT | Performed by: OBSTETRICS & GYNECOLOGY

## 2022-09-28 NOTE — PROGRESS NOTES
[address] [facility]  [email or web address]  [phone]    Regino Lehman MD, FACOG       OB/GYN: OB Problem visit    Chief Complaint:   Chief Complaint   Patient presents with    Routine Prenatal Visit    Vaginal Discharge       Patient Active Problem List    Diagnosis    H/O:     Prenatal care of multigravida, antepartum     Urine dip each visit d/t BP  Mood changes on lexapro 10 mg, does not want to inc 2022  Ho cs;  post plac: REPEAT  MFM FS  (labile BP, fh spina bifida: sister, Robyn Harris), likely [facility]  22 MFM FS  Declines genetic screening testing 3/25/2022 , will notify office if changes her mind  HO PIH< elevated BP fist tri: ? CHTN: add cmp to next labs ur pr cr ratio on 22 done 2022 BV -flagyl   cr: 0.05,/lft    afp neg  22 lft: AST 22, ALT 27  Hemoglobin electo WNL  3/25/22 ur pr cr random ratio: 292  22 micro/cr ratio 15   Urine cx needed at  visit; not collected at EOB  -- done 22 neg  Ho migraine HA: no prepreg meds. EPDS NV if not done recently 2022 EPDS 16  tob and mj use  8/3/22 abn 1hr gtt  8/3/22 HGB: 10.7  22 3hr gtt 0/4 abn  Ferritin 22: 11  Depression lexapro Rec counseling  Home BP cuff:  r cs ? 45 w email sent  Repeat C/S [email or web address]  22 - CBC, CMP; normal results - mild anemia , ratio: 349 (lc)  24 hour urine NV  9/15/22 - prenatal labs normal       Depression affecting pregnancy     On lexapro 10, unable to wean      Gestational hypertension    Missed        History of Present Illness: The patient is a 28 y.o.  female who reports having pelvic pressure 10/10 on pain scale, milky discharge, feeling dizzy, seeing spots, tingling in her hands & feet, swelling in her feet once. Feels better today. This is a new problem. This is a routinely scheduled OB appointment. She reports the symptoms are has worsened. Aggravating factors include none.    Alleviating factors include none. She does not have other concerns. PFSH:  Past Medical History:   Diagnosis Date    Abnormal Pap smear     LGSIL-colpo    Depression affecting pregnancy 3/25/2022    Encounter for Papanicolaou smear for cervical cancer screening 2022    neg/hpv neg    Essential hypertension     blood pressure up in office today    HX OTHER MEDICAL     recurrent UTI     No past surgical history on file. Family History   Problem Relation Age of Onset    Diabetes Mother     Hypertension Mother     Psychiatric Disorder Brother     Mental Retardation Brother     Heart Disease Maternal Grandmother     Hypertension Maternal Grandmother     Hypertension Maternal Grandfather     Diabetes Paternal Grandfather     Elevated Lipids Paternal Grandfather      Social History     Tobacco Use    Smoking status: Every Day     Types: Cigarettes     Last attempt to quit: 2014     Years since quittin.1    Smokeless tobacco: Never   Substance Use Topics    Alcohol use: No    Drug use: Yes     Types: Marijuana     No Known Allergies  Current Outpatient Medications   Medication Sig    promethazine (PHENERGAN) 12.5 mg tablet Take 1 Tablet by mouth every six (6) hours as needed for Nausea for up to 30 days. escitalopram oxalate (LEXAPRO) 10 mg tablet Take 5 mg by mouth every other day. prenatal vit-iron fumarate-fa 27 mg iron- 0.8 mg tab tablet Take 1 Tablet by mouth daily. Ferrous Fumarate 325 mg (106 mg iron) tab Take 1 Tablet by mouth. (Patient not taking: No sig reported)    BABY ASPIRIN PO Take  by mouth. (Patient not taking: No sig reported)    magnesium 250 mg tab Take  by mouth. (Patient not taking: No sig reported)    acetaminophen (TYLENOL) 325 mg tablet Take  by mouth every four (4) hours as needed for Pain. (Patient not taking: No sig reported)    calcium carbonate (TUMS) 200 mg calcium (500 mg) chew Take 1 Tablet by mouth daily.  (Patient not taking: No sig reported)    promethazine (PHENERGAN) 12.5 mg tablet Take 1 Tablet by mouth every six (6) hours as needed for Nausea (patient may take 1/2 dose if causing drowsiness.). (Patient not taking: No sig reported)    naproxen (NAPROSYN) 375 mg tablet Take 1 Tab by mouth two (2) times daily (with meals). (Patient not taking: No sig reported)     No current facility-administered medications for this visit.        Review of Systems:  History obtained from the patient and written ROS questionnaire  Constitutional: negative for fevers, chills and weight loss  ENT ROS: negative for - hearing change, oral lesions or visual changes  Respiratory: negative for cough, wheezing or dyspnea on exertion  Cardiovascular: negative for chest pain, irregular heart beats, exertional chest pressure/discomfort  Gastrointestinal: negative for dysphagia, nausea and vomiting  Genito-Urinary ROS: , see HPI  Inteument/breast: negative for rash, breast lump and nipple discharge  Musculoskeletal:negative for stiff joints, neck pain and muscle weakness  Endocrine ROS: negative for - breast changes, galactorrhea or temperature intolerance  Hematological and Lymphatic ROS: negative for - blood clots, bruising or swollen lymph nodes    Physical Exam:  Visit Vitals  /88   Pulse (!) 101   Ht 5' 7\" (1.702 m)   Wt 168 lb 3.2 oz (76.3 kg)   BMI 26.34 kg/m²       GENERAL: alert, well appearing, and in no distress  HEAD; normocephalic, atraumatic  PULM: clear to auscultation, no wheezes, rales or rhonchi, symmetric air entry   COR: normal rate and regular rhythm, S1 and S2 normal   ABDOMEN: soft, nontender, nondistended, no masses or organomegaly   BACK: normal range of motion, no tenderness, no CVAT   EGBUS: no lesions, no inflammation, no masses  VULVA: normal appearing vulva with no masses, tenderness or lesions  VAGINA: normal appearing vagina with normal color, no lesions, white discharge  CERVIX: normal appearing cervix without discharge or lesions, non tender  UTERUS: uterus is enlarged in size, gravid appropriate for gestational age  [de-identified]: normal adnexa in size, nontender and no masses  NEURO: alert, oriented, normal speech    See PN flowsheet for additional notes and exam    Assessment:  28 y.o.  35w4d   Encounter Diagnoses   Name Primary? Prenatal care of multigravida, antepartum Yes    Elevated blood pressure affecting pregnancy in third trimester, antepartum        Plan:  An evaluation of this patient's concern is planned. The patient is advised that she should contact the office if she does not note improvement or if symptoms recur  She should contact our office with any questions or concerns  She could keep her routine OB appointment. Disc concerns for PIH/preeclampsia and rec LD evaluation, pt reports she feels better today and prefers office labs and MFM fu as scheduled tomorrow. Disc advantage of lab turnaround if done in the hospital and serial BP: pt defers hospital evaluation  PIH precautions reviewed. Orders Placed This Encounter    CBC W/O DIFF    METABOLIC PANEL, COMPREHENSIVE    PROTEIN/CREATININE RATIO, URINE         No results found for this visit on 22.     Mariam Jasmine MD

## 2022-09-29 LAB
ALBUMIN SERPL-MCNC: 4 G/DL (ref 3.8–4.8)
ALBUMIN/GLOB SERPL: 1.7 {RATIO} (ref 1.2–2.2)
ALP SERPL-CCNC: 122 IU/L (ref 44–121)
ALT SERPL-CCNC: 12 IU/L (ref 0–32)
AST SERPL-CCNC: 17 IU/L (ref 0–40)
BILIRUB SERPL-MCNC: 0.3 MG/DL (ref 0–1.2)
BUN SERPL-MCNC: 9 MG/DL (ref 6–20)
BUN/CREAT SERPL: 17 (ref 9–23)
CALCIUM SERPL-MCNC: 9.6 MG/DL (ref 8.7–10.2)
CHLORIDE SERPL-SCNC: 103 MMOL/L (ref 96–106)
CO2 SERPL-SCNC: 21 MMOL/L (ref 20–29)
CREAT SERPL-MCNC: 0.52 MG/DL (ref 0.57–1)
CREAT UR-MCNC: 106 MG/DL
EGFR: 127 ML/MIN/1.73
ERYTHROCYTE [DISTWIDTH] IN BLOOD BY AUTOMATED COUNT: 12.5 % (ref 11.7–15.4)
GLOBULIN SER CALC-MCNC: 2.4 G/DL (ref 1.5–4.5)
GLUCOSE SERPL-MCNC: 66 MG/DL (ref 70–99)
HCT VFR BLD AUTO: 35 % (ref 34–46.6)
HGB BLD-MCNC: 12.2 G/DL (ref 11.1–15.9)
MCH RBC QN AUTO: 32.4 PG (ref 26.6–33)
MCHC RBC AUTO-ENTMCNC: 34.9 G/DL (ref 31.5–35.7)
MCV RBC AUTO: 93 FL (ref 79–97)
PLATELET # BLD AUTO: 238 X10E3/UL (ref 150–450)
POTASSIUM SERPL-SCNC: 4.2 MMOL/L (ref 3.5–5.2)
PROT SERPL-MCNC: 6.4 G/DL (ref 6–8.5)
PROT UR-MCNC: 19.5 MG/DL
PROT/CREAT UR: 184 MG/G CREAT (ref 0–200)
RBC # BLD AUTO: 3.76 X10E6/UL (ref 3.77–5.28)
SODIUM SERPL-SCNC: 138 MMOL/L (ref 134–144)
WBC # BLD AUTO: 6.9 X10E3/UL (ref 3.4–10.8)

## 2022-09-30 NOTE — PROGRESS NOTES
Normal results, add to prenatal records. We can review in detail with patient at next visit. [address] message sent if active.   Add PIH labs to PL (hgb,plat,cr, ast, alt, ur: prot 24 hr total or ratio) w date

## 2022-10-06 ENCOUNTER — ROUTINE PRENATAL (OUTPATIENT)
Dept: OBGYN CLINIC | Age: 32
End: 2022-10-06
Payer: MEDICAID

## 2022-10-06 ENCOUNTER — HOSPITAL ENCOUNTER (OUTPATIENT)
Dept: PERINATAL CARE | Age: 32
Discharge: HOME | End: 2022-10-06
Attending: OBSTETRICS & GYNECOLOGY
Payer: MEDICAID

## 2022-10-06 ENCOUNTER — HOSPITAL ENCOUNTER (EMERGENCY)
Age: 32
Discharge: HOME | End: 2022-10-06
Attending: OBSTETRICS & GYNECOLOGY | Admitting: OBSTETRICS & GYNECOLOGY
Payer: MEDICAID

## 2022-10-06 VITALS
WEIGHT: 170.8 LBS | SYSTOLIC BLOOD PRESSURE: 145 MMHG | BODY MASS INDEX: 26.81 KG/M2 | DIASTOLIC BLOOD PRESSURE: 91 MMHG | HEIGHT: 67 IN | HEART RATE: 85 BPM

## 2022-10-06 VITALS — HEART RATE: 89 BPM | DIASTOLIC BLOOD PRESSURE: 87 MMHG | OXYGEN SATURATION: 98 % | SYSTOLIC BLOOD PRESSURE: 126 MMHG

## 2022-10-06 DIAGNOSIS — O13.9 GESTATIONAL HYPERTENSION, ANTEPARTUM: ICD-10-CM

## 2022-10-06 DIAGNOSIS — Z34.80 PRENATAL CARE OF MULTIGRAVIDA, ANTEPARTUM: Primary | ICD-10-CM

## 2022-10-06 LAB
ABO + RH BLD: NORMAL
ALBUMIN SERPL-MCNC: 3 G/DL (ref 3.5–5)
ALBUMIN/GLOB SERPL: 0.9 {RATIO} (ref 1.1–2.2)
ALP SERPL-CCNC: 135 U/L (ref 45–117)
ALT SERPL-CCNC: 17 U/L (ref 12–78)
ANION GAP SERPL CALC-SCNC: 5 MMOL/L (ref 5–15)
AST SERPL-CCNC: 15 U/L (ref 15–37)
BASOPHILS # BLD: 0.1 K/UL (ref 0–0.1)
BASOPHILS NFR BLD: 1 % (ref 0–1)
BILIRUB SERPL-MCNC: 0.3 MG/DL (ref 0.2–1)
BLOOD GROUP ANTIBODIES SERPL: NORMAL
BUN SERPL-MCNC: 6 MG/DL (ref 6–20)
BUN/CREAT SERPL: 13 (ref 12–20)
CALCIUM SERPL-MCNC: 9.1 MG/DL (ref 8.5–10.1)
CHLORIDE SERPL-SCNC: 107 MMOL/L (ref 97–108)
CO2 SERPL-SCNC: 23 MMOL/L (ref 21–32)
CREAT SERPL-MCNC: 0.48 MG/DL (ref 0.55–1.02)
CREAT UR-MCNC: 80 MG/DL
DIFFERENTIAL METHOD BLD: ABNORMAL
EOSINOPHIL # BLD: 0.1 K/UL (ref 0–0.4)
EOSINOPHIL NFR BLD: 1 % (ref 0–7)
ERYTHROCYTE [DISTWIDTH] IN BLOOD BY AUTOMATED COUNT: 12.8 % (ref 11.5–14.5)
GLOBULIN SER CALC-MCNC: 3.4 G/DL (ref 2–4)
GLUCOSE SERPL-MCNC: 79 MG/DL (ref 65–100)
HCT VFR BLD AUTO: 34.8 % (ref 35–47)
HGB BLD-MCNC: 11.9 G/DL (ref 11.5–16)
IMM GRANULOCYTES # BLD AUTO: 0.1 K/UL (ref 0–0.04)
IMM GRANULOCYTES NFR BLD AUTO: 1 % (ref 0–0.5)
LYMPHOCYTES # BLD: 1.9 K/UL (ref 0.8–3.5)
LYMPHOCYTES NFR BLD: 26 % (ref 12–49)
MCH RBC QN AUTO: 32.2 PG (ref 26–34)
MCHC RBC AUTO-ENTMCNC: 34.2 G/DL (ref 30–36.5)
MCV RBC AUTO: 94.3 FL (ref 80–99)
MONOCYTES # BLD: 0.5 K/UL (ref 0–1)
MONOCYTES NFR BLD: 6 % (ref 5–13)
NEUTS SEG # BLD: 4.8 K/UL (ref 1.8–8)
NEUTS SEG NFR BLD: 65 % (ref 32–75)
NRBC # BLD: 0 K/UL (ref 0–0.01)
NRBC BLD-RTO: 0 PER 100 WBC
PLATELET # BLD AUTO: 245 K/UL (ref 150–400)
PMV BLD AUTO: 10.1 FL (ref 8.9–12.9)
POTASSIUM SERPL-SCNC: 4.2 MMOL/L (ref 3.5–5.1)
PROT SERPL-MCNC: 6.4 G/DL (ref 6.4–8.2)
PROT UR-MCNC: 26 MG/DL (ref 0–11.9)
PROT/CREAT UR-RTO: 0.3
RBC # BLD AUTO: 3.69 M/UL (ref 3.8–5.2)
SODIUM SERPL-SCNC: 135 MMOL/L (ref 136–145)
SPECIMEN EXP DATE BLD: NORMAL
WBC # BLD AUTO: 7.4 K/UL (ref 3.6–11)

## 2022-10-06 PROCEDURE — 99213 OFFICE O/P EST LOW 20 MIN: CPT | Performed by: OBSTETRICS & GYNECOLOGY

## 2022-10-06 PROCEDURE — 76818 FETAL BIOPHYS PROFILE W/NST: CPT | Performed by: OBSTETRICS & GYNECOLOGY

## 2022-10-06 PROCEDURE — 80053 COMPREHEN METABOLIC PANEL: CPT

## 2022-10-06 PROCEDURE — 86900 BLOOD TYPING SEROLOGIC ABO: CPT

## 2022-10-06 PROCEDURE — 59025 FETAL NON-STRESS TEST: CPT | Performed by: OBSTETRICS & GYNECOLOGY

## 2022-10-06 PROCEDURE — 99284 EMERGENCY DEPT VISIT MOD MDM: CPT

## 2022-10-06 PROCEDURE — 0502F SUBSEQUENT PRENATAL CARE: CPT | Performed by: OBSTETRICS & GYNECOLOGY

## 2022-10-06 PROCEDURE — 76816 OB US FOLLOW-UP PER FETUS: CPT | Performed by: OBSTETRICS & GYNECOLOGY

## 2022-10-06 PROCEDURE — 74011250637 HC RX REV CODE- 250/637: Performed by: OBSTETRICS & GYNECOLOGY

## 2022-10-06 PROCEDURE — 85025 COMPLETE CBC W/AUTO DIFF WBC: CPT

## 2022-10-06 PROCEDURE — 59025 FETAL NON-STRESS TEST: CPT

## 2022-10-06 PROCEDURE — 36415 COLL VENOUS BLD VENIPUNCTURE: CPT

## 2022-10-06 PROCEDURE — 84156 ASSAY OF PROTEIN URINE: CPT

## 2022-10-06 RX ORDER — ACETAMINOPHEN 325 MG/1
650 TABLET ORAL
Status: DISCONTINUED | OUTPATIENT
Start: 2022-10-06 | End: 2022-10-06 | Stop reason: HOSPADM

## 2022-10-06 RX ORDER — CALCIUM CARBONATE 200(500)MG
200 TABLET,CHEWABLE ORAL
Status: DISCONTINUED | OUTPATIENT
Start: 2022-10-06 | End: 2022-10-06 | Stop reason: HOSPADM

## 2022-10-06 RX ADMIN — ACETAMINOPHEN 650 MG: 325 TABLET ORAL at 13:47

## 2022-10-06 RX ADMIN — ANTACID TABLETS 200 MG: 500 TABLET, CHEWABLE ORAL at 13:47

## 2022-10-06 NOTE — PROGRESS NOTES
[facility]  [email or web address]  [phone]       Follow-up OB visit    Chief Complaint   Patient presents with    Routine Prenatal Visit       Patient Active Problem List    Diagnosis Date Noted    H/O:  2022    Prenatal care of multigravida, antepartum 2022    Depression affecting pregnancy 2022    Gestational hypertension 2012    Missed  2011        The patient reports the following concerns: MFM today, did not have good experience with staff there. Pt reports this morning she woke up with a wet spot on her bed. Pt reports low back intermittent pain for the past few days. Pt was on 10mg of lexapro then 5mg now she hasn't taken it in the past 5-6 days. Pt reports feeling dizzy & seeing spots. Pt is planned for  on 10/19/22. Gbs & 36wk handouts today. Pt reports nausea & vomiting everyday with acid, & she has been taking promethazine. Vitals:    10/06/22 1053 10/06/22 1055   BP: (!) 136/90 (!) 145/91   Pulse: 85    Weight: 170 lb 12.8 oz (77.5 kg)    Height: 5' 7\" (1.702 m)      See PN flowsheet for exam    28 y.o.  36w5d   Encounter Diagnosis   Name Primary? Prenatal care of multigravida, antepartum Yes       To LD for [facility] evaluation.      [] SAB/bleeding precautions reviewed   [] PTL/PPROM precautions reviewed   [] Labor precautions reviewed   [] Fetal kick counts discussed   [] Labs reviewed with patient   [] Luís Lover precautions reviewed   [] Consent reviewed   [] Handouts given to pt   [] Glucola handout    [] GBS/labor/Magic Hour handout   []    []    [] We reviewed CDC recommendations for Tdap for patient and close contacts and RBA of receiving in pregnancy, advised obtaining in third trimester   [] Reviewed healthy nutrition in pregnancy and good exercise practices   [] We disc safer medications in pregnancy and referred patient to [facility] resources   [] We reviewed CDC recommendations for flu vaccine and RBA of receiving in pregnancy   [] Flu vaccine recommendations reviewed   [] COVID vaccine/booster recommendations reviewed   []           Orders Placed This Encounter    Fanny Allen MD

## 2022-10-06 NOTE — H&P
[address] [facility]  [email or web address]  [phone]    Suha Soriano MD, FACOG     Labor and Delivery History & Physical  Template updated     Name: Darrin Jovel MRN: [id number]  SSN: [id number]    YOB: 1990  Age: 28 y.o. Sex: female        Subjective:     Estimated Date of Delivery: 10/29/22  OB History          3    Para   1    Term   1       0    AB   1    Living   1         SAB   0    IAB   0    Ectopic   0    Molar        Multiple   0    Live Births   1                Ms. Nataly Burleson is arrived to L and D from the office with a pregnancy at 36w5d for  dizziness, headache, weakness and elevated BP in office . Prenatal course was complicated by  labiel BP . Please see prenatal records for details. Patient reports good fetal movement, no loss or gush of fluid, no vaginal bleeding, no significant contractions or abdominal pain     Pt c/o increase VD, evaluated in office, ntz neg. Past Medical History:   Diagnosis Date    Abnormal Pap smear     LGSIL-colpo    Depression affecting pregnancy 3/25/2022    Encounter for Papanicolaou smear for cervical cancer screening 2022    neg/hpv neg    Essential hypertension     blood pressure up in office today    HX OTHER MEDICAL     recurrent UTI     No past surgical history on file.   Social History     Socioeconomic History    Marital status: SINGLE   Tobacco Use    Smoking status: Every Day     Types: Cigarettes     Last attempt to quit: 2014     Years since quittin.1    Smokeless tobacco: Never   Substance and Sexual Activity    Alcohol use: No    Drug use: Yes     Types: Marijuana    Sexual activity: Yes     Partners: Male     Birth control/protection: None   Social History Narrative    ** Merged History Encounter **          Family History   Problem Relation Age of Onset    Diabetes Mother     Hypertension Mother     Psychiatric Disorder Brother     Mental Retardation Brother     Heart Disease Maternal Grandmother     Hypertension Maternal Grandmother     Hypertension Maternal Grandfather     Diabetes Paternal Grandfather     Elevated Lipids Paternal Grandfather        No Known Allergies  Prior to Admission medications    Medication Sig Start Date End Date Taking? Authorizing Provider   promethazine (PHENERGAN) 12.5 mg tablet Take 1 Tablet by mouth every six (6) hours as needed for Nausea for up to 30 days. 9/15/22 10/15/22  Carline Dobson MD   Ferrous Fumarate 325 mg (106 mg iron) tab Take 1 Tablet by mouth. Patient not taking: No sig reported    Provider, Historical   BABY ASPIRIN PO Take  by mouth. Patient not taking: No sig reported    Provider, Historical   magnesium 250 mg tab Take  by mouth. Patient not taking: No sig reported    Provider, Historical   acetaminophen (TYLENOL) 325 mg tablet Take  by mouth every four (4) hours as needed for Pain. Patient not taking: No sig reported    Provider, Historical   calcium carbonate (TUMS) 200 mg calcium (500 mg) chew Take 1 Tablet by mouth daily. Patient not taking: No sig reported    Provider, Historical   promethazine (PHENERGAN) 12.5 mg tablet Take 1 Tablet by mouth every six (6) hours as needed for Nausea (patient may take 1/2 dose if causing drowsiness. ). Patient not taking: No sig reported 5/20/22   Carline Dobson MD   escitalopram oxalate (LEXAPRO) 10 mg tablet Take 5 mg by mouth every other day. Patient not taking: Reported on 10/6/2022 3/12/22   Provider, Historical   prenatal vit-iron fumarate-fa 27 mg iron- 0.8 mg tab tablet Take 1 Tablet by mouth daily. Other, MD Arcelia        There are no active hospital problems to display for this patient. Review of Systems: A comprehensive review of systems was negative except for that written in the HPI.   Constitutional: negative for fevers, chills and weight loss  ENT ROS: negative for - hearing change, oral lesions or visual changes  Respiratory: negative for cough, wheezing or dyspnea on exertion  Cardiovascular: negative for chest pain, irregular heart beats, exertional chest pressure/discomfort  Gastrointestinal: negative for dysphagia, nausea and vomiting  Genito-Urinary ROS: see HPI  Inteument/breast: negative for rash, breast lump and nipple discharge  Musculoskeletal:negative for stiff joints, neck pain and muscle weakness  Endocrine ROS: negative for - breast changes, galactorrhea or temperature intolerance  Hematological and Lymphatic ROS: negative for - bruising or swollen lymph nodes      Objective:     Vitals:  No data found. Physical Exam:  Patient without distress.   Heart: Regular rate and rhythm or S1S2 present  Lung: clear to auscultation throughout lung fields, no wheezes, no rales, no rhonchi and normal respiratory effort  Abdomen: soft, nontender  Fundus: soft and non tender  Cervical Exam: 20/closed  Lower Extremities: no c/t/e    Membranes:   intact    Prenatal Labs:   Lab Results   Component Value Date/Time    Rubella, External immune 10/26/2011 12:00 AM    GrBStrep, External positive 04/18/2012 12:00 AM    HBsAg, External negative 10/26/2011 12:00 AM    HIV, External negative 10/26/2011 12:00 AM    RPR, External non-reactive 10/26/2011 12:00 AM    Gonorrhea, External negative 10/26/2011 12:00 AM    Chlamydia, External negative 10/26/2011 12:00 AM          Recent Results (from the past 12 hour(s))   CBC WITH AUTOMATED DIFF    Collection Time: 10/06/22 11:58 AM   Result Value Ref Range    WBC 7.4 3.6 - 11.0 K/uL    RBC 3.69 (L) 3.80 - 5.20 M/uL    HGB 11.9 11.5 - 16.0 g/dL    HCT 34.8 (L) 35.0 - 47.0 %    MCV 94.3 80.0 - 99.0 FL    MCH 32.2 26.0 - 34.0 PG    MCHC 34.2 30.0 - 36.5 g/dL    RDW 12.8 11.5 - 14.5 %    PLATELET 619 041 - 939 K/uL    MPV 10.1 8.9 - 12.9 FL    NRBC 0.0 0  WBC    ABSOLUTE NRBC 0.00 0.00 - 0.01 K/uL    NEUTROPHILS 65 32 - 75 %    LYMPHOCYTES 26 12 - 49 %    MONOCYTES 6 5 - 13 %    EOSINOPHILS 1 0 - 7 % BASOPHILS 1 0 - 1 %    IMMATURE GRANULOCYTES 1 (H) 0.0 - 0.5 %    ABS. NEUTROPHILS 4.8 1.8 - 8.0 K/UL    ABS. LYMPHOCYTES 1.9 0.8 - 3.5 K/UL    ABS. MONOCYTES 0.5 0.0 - 1.0 K/UL    ABS. EOSINOPHILS 0.1 0.0 - 0.4 K/UL    ABS. BASOPHILS 0.1 0.0 - 0.1 K/UL    ABS. IMM. GRANS. 0.1 (H) 0.00 - 0.04 K/UL    DF AUTOMATED     METABOLIC PANEL, COMPREHENSIVE    Collection Time: 10/06/22 11:58 AM   Result Value Ref Range    Sodium 135 (L) 136 - 145 mmol/L    Potassium 4.2 3.5 - 5.1 mmol/L    Chloride 107 97 - 108 mmol/L    CO2 23 21 - 32 mmol/L    Anion gap 5 5 - 15 mmol/L    Glucose 79 65 - 100 mg/dL    BUN 6 6 - 20 MG/DL    Creatinine 0.48 (L) 0.55 - 1.02 MG/DL    BUN/Creatinine ratio 13 12 - 20      eGFR >60 >60 ml/min/1.73m2    Calcium 9.1 8.5 - 10.1 MG/DL    Bilirubin, total 0.3 0.2 - 1.0 MG/DL    ALT (SGPT) 17 12 - 78 U/L    AST (SGOT) 15 15 - 37 U/L    Alk. phosphatase 135 (H) 45 - 117 U/L    Protein, total 6.4 6.4 - 8.2 g/dL    Albumin 3.0 (L) 3.5 - 5.0 g/dL    Globulin 3.4 2.0 - 4.0 g/dL    A-G Ratio 0.9 (L) 1.1 - 2.2     TYPE & SCREEN    Collection Time: 10/06/22 11:58 AM   Result Value Ref Range    Crossmatch Expiration 10/09/2022,2359     ABO/Rh(D) [address] POSITIVE     Antibody screen NEG    PROTEIN/CREATININE RATIO, URINE    Collection Time: 10/06/22 11:58 AM   Result Value Ref Range    Protein, urine random 26 (H) 0.0 - 11.9 mg/dL    Creatinine, urine 80.00 mg/dL    Protein/Creat. urine Ratio 0.3             Assessment/Plan:   28 y.o.  36w5d  Labile BP    The patient does have anemia  GBS pending    Reassuring fetal surveillance    Past Medical History:   Diagnosis Date    Abnormal Pap smear     LGSIL-colpo    Depression affecting pregnancy 3/25/2022    Encounter for Papanicolaou smear for cervical cancer screening 2022    neg/hpv neg    Essential hypertension     blood pressure up in office today    HX OTHER MEDICAL     recurrent UTI         Plan: Admit for  [facility] labs, serial BP .    CEFM/TOCO    Signed By:  Tonny Miller MD     October 6, 2022         NST Inpatient Procedure Note    Suly Marcus presents for fetal non-stress test.    Indication is PIH. She is 36w5d. She has been monitored for more than 60   minutes. The FHR was reactive. NST Interpretation:   FHR baseline 140-150 bpm,   Variability:  moderate  Accelerations:  present  Decelerations Absent. Uterine contractions:   mild/irregular    Assessment  NST is reactive. NST is reassuring. Patient does need admission/observation for further monitoring. Ana Truong was informed of the NST results and her questions were answered. Plan:    [] Continue admission to labor and delivery    [x] Continue observation   [] Keep routine OB follow up upon discharge   [] Reviewed fetal movement kick counts, notify MD if decreased   []    []        On this date, 10/6/2022,  I have spent 30 minutes reviewing previous notes, examining the patient, reviewing test results and face to face time with the patient discussing the diagnosis, plan of care and importance of compliance with the treatment plan and perfroming an exam.  We reviewed the planned hospital course as well as documented on the day of the hospitalization.

## 2022-10-06 NOTE — PROGRESS NOTES
1125 This RN entered room to find pt at bedside. Pt stated she started to feel light headed on Monday or Tuesday. Pt denies LOF or bleeding. Pt admits to ctx starting at 0230 yesterday accompanied with RUQ pain that radiates to the back. Pt has been vomiting all throughout pregnancy. Pt stated she has not eaten today since vomiting. [phone] This RN informed MD of pt's Bps and lab work via perfect serve. 1518 This RN phoned MD. Waiting for call back. 2[address] Luther MD stated this RN can discharge pt.    [phone] This RN provided pt with verbal and written instructions.

## 2022-10-06 NOTE — PROGRESS NOTES
Patient Vitals for the past 24 hrs:   Pulse BP SpO2   10/06/22 1423 89 126/87 --   10/06/22 1353 85 131/74 --   10/06/22 1343 -- -- 98 %   10/06/22 1338 -- -- 99 %   10/06/22 1333 -- -- 98 %   10/06/22 1328 -- -- 97 %   10/06/22 1323 80 124/75 99 %   10/06/22 1318 -- -- 100 %   10/06/22 1313 -- -- 100 %   10/06/22 1308 -- -- 100 %     HA resolved. Back RUQ pain resolved. Good FM. 28 y.o.  36w5d  Labile BP, no s/sx preeclamspia    Discharge planning  PIH precautions  Keep MFM fu 1 week.     Lori Vincent MD

## 2022-10-09 LAB — B-HEM STREP SPEC QL CULT: POSITIVE

## 2022-10-13 ENCOUNTER — ANESTHESIA EVENT (OUTPATIENT)
Dept: LABOR AND DELIVERY | Age: 32
DRG: 540 | End: 2022-10-13
Payer: MEDICAID

## 2022-10-13 ENCOUNTER — ANESTHESIA (OUTPATIENT)
Dept: LABOR AND DELIVERY | Age: 32
DRG: 540 | End: 2022-10-13
Payer: MEDICAID

## 2022-10-13 ENCOUNTER — ROUTINE PRENATAL (OUTPATIENT)
Dept: OBGYN CLINIC | Age: 32
End: 2022-10-13
Payer: MEDICAID

## 2022-10-13 ENCOUNTER — HOSPITAL ENCOUNTER (INPATIENT)
Age: 32
LOS: 2 days | Discharge: HOME | DRG: 540 | End: 2022-10-15
Attending: OBSTETRICS & GYNECOLOGY | Admitting: OBSTETRICS & GYNECOLOGY
Payer: MEDICAID

## 2022-10-13 VITALS — BODY MASS INDEX: 27.41 KG/M2 | WEIGHT: 175 LBS | DIASTOLIC BLOOD PRESSURE: 92 MMHG | SYSTOLIC BLOOD PRESSURE: 148 MMHG

## 2022-10-13 DIAGNOSIS — Z98.891 H/O: C-SECTION: ICD-10-CM

## 2022-10-13 DIAGNOSIS — R07.9 CHEST PAIN, UNSPECIFIED TYPE: Primary | ICD-10-CM

## 2022-10-13 DIAGNOSIS — Z34.80 PRENATAL CARE OF MULTIGRAVIDA, ANTEPARTUM: ICD-10-CM

## 2022-10-13 DIAGNOSIS — O16.3 ELEVATED BLOOD PRESSURE AFFECTING PREGNANCY IN THIRD TRIMESTER, ANTEPARTUM: Primary | ICD-10-CM

## 2022-10-13 DIAGNOSIS — O99.340 DEPRESSION AFFECTING PREGNANCY: ICD-10-CM

## 2022-10-13 DIAGNOSIS — O13.9 GESTATIONAL HYPERTENSION, ANTEPARTUM: ICD-10-CM

## 2022-10-13 DIAGNOSIS — F32.A DEPRESSION AFFECTING PREGNANCY: ICD-10-CM

## 2022-10-13 LAB
ABO + RH BLD: NORMAL
ALBUMIN SERPL-MCNC: 2.8 G/DL (ref 3.5–5)
ALBUMIN/GLOB SERPL: 0.9 {RATIO} (ref 1.1–2.2)
ALP SERPL-CCNC: 124 U/L (ref 45–117)
ALT SERPL-CCNC: 16 U/L (ref 12–78)
ANION GAP SERPL CALC-SCNC: 8 MMOL/L (ref 5–15)
AST SERPL-CCNC: 15 U/L (ref 15–37)
BASOPHILS # BLD: 0.1 K/UL (ref 0–0.1)
BASOPHILS NFR BLD: 1 % (ref 0–1)
BILIRUB SERPL-MCNC: 0.2 MG/DL (ref 0.2–1)
BLOOD GROUP ANTIBODIES SERPL: NORMAL
BUN SERPL-MCNC: 12 MG/DL (ref 6–20)
BUN/CREAT SERPL: 27 (ref 12–20)
CALCIUM SERPL-MCNC: 9 MG/DL (ref 8.5–10.1)
CHLORIDE SERPL-SCNC: 108 MMOL/L (ref 97–108)
CO2 SERPL-SCNC: 23 MMOL/L (ref 21–32)
CREAT SERPL-MCNC: 0.44 MG/DL (ref 0.55–1.02)
CREAT UR-MCNC: 93 MG/DL
DIFFERENTIAL METHOD BLD: ABNORMAL
EOSINOPHIL # BLD: 0.1 K/UL (ref 0–0.4)
EOSINOPHIL NFR BLD: 1 % (ref 0–7)
ERYTHROCYTE [DISTWIDTH] IN BLOOD BY AUTOMATED COUNT: 13 % (ref 11.5–14.5)
GLOBULIN SER CALC-MCNC: 3.2 G/DL (ref 2–4)
GLUCOSE SERPL-MCNC: 79 MG/DL (ref 65–100)
HCT VFR BLD AUTO: 32.7 % (ref 35–47)
HGB BLD-MCNC: 10.9 G/DL (ref 11.5–16)
IMM GRANULOCYTES # BLD AUTO: 0.1 K/UL (ref 0–0.04)
IMM GRANULOCYTES NFR BLD AUTO: 2 % (ref 0–0.5)
LYMPHOCYTES # BLD: 1.8 K/UL (ref 0.8–3.5)
LYMPHOCYTES NFR BLD: 25 % (ref 12–49)
MCH RBC QN AUTO: 31.8 PG (ref 26–34)
MCHC RBC AUTO-ENTMCNC: 33.3 G/DL (ref 30–36.5)
MCV RBC AUTO: 95.3 FL (ref 80–99)
MONOCYTES # BLD: 0.5 K/UL (ref 0–1)
MONOCYTES NFR BLD: 7 % (ref 5–13)
NEUTS SEG # BLD: 4.8 K/UL (ref 1.8–8)
NEUTS SEG NFR BLD: 64 % (ref 32–75)
NRBC # BLD: 0 K/UL (ref 0–0.01)
NRBC BLD-RTO: 0 PER 100 WBC
PLATELET # BLD AUTO: 210 K/UL (ref 150–400)
PMV BLD AUTO: 10.3 FL (ref 8.9–12.9)
POTASSIUM SERPL-SCNC: 4.1 MMOL/L (ref 3.5–5.1)
PROT SERPL-MCNC: 6 G/DL (ref 6.4–8.2)
PROT UR-MCNC: 23 MG/DL (ref 0–11.9)
PROT/CREAT UR-RTO: 0.2
RBC # BLD AUTO: 3.43 M/UL (ref 3.8–5.2)
SODIUM SERPL-SCNC: 139 MMOL/L (ref 136–145)
SPECIMEN EXP DATE BLD: NORMAL
TROPONIN-HIGH SENSITIVITY: 6 NG/L (ref 0–51)
WBC # BLD AUTO: 7.4 K/UL (ref 3.6–11)

## 2022-10-13 PROCEDURE — 77030018809 HC RETRCTR ALXSO DISP AMR -B

## 2022-10-13 PROCEDURE — 74011250636 HC RX REV CODE- 250/636: Performed by: OBSTETRICS & GYNECOLOGY

## 2022-10-13 PROCEDURE — 75410000003 HC RECOV DEL/VAG/CSECN EA 0.5 HR: Performed by: OBSTETRICS & GYNECOLOGY

## 2022-10-13 PROCEDURE — 77030005513 HC CATH URETH FOL11 MDII -B

## 2022-10-13 PROCEDURE — 74011250636 HC RX REV CODE- 250/636: Performed by: NURSE ANESTHETIST, CERTIFIED REGISTERED

## 2022-10-13 PROCEDURE — 99285 EMERGENCY DEPT VISIT HI MDM: CPT

## 2022-10-13 PROCEDURE — 4A1HXCZ MONITORING OF PRODUCTS OF CONCEPTION, CARDIAC RATE, EXTERNAL APPROACH: ICD-10-PCS | Performed by: OBSTETRICS & GYNECOLOGY

## 2022-10-13 PROCEDURE — 86900 BLOOD TYPING SEROLOGIC ABO: CPT

## 2022-10-13 PROCEDURE — 80053 COMPREHEN METABOLIC PANEL: CPT

## 2022-10-13 PROCEDURE — 51702 INSERT TEMP BLADDER CATH: CPT

## 2022-10-13 PROCEDURE — 0502F SUBSEQUENT PRENATAL CARE: CPT | Performed by: OBSTETRICS & GYNECOLOGY

## 2022-10-13 PROCEDURE — 77030040361 HC SLV COMPR DVT MDII -B

## 2022-10-13 PROCEDURE — 59025 FETAL NON-STRESS TEST: CPT | Performed by: OBSTETRICS & GYNECOLOGY

## 2022-10-13 PROCEDURE — 74011000250 HC RX REV CODE- 250: Performed by: NURSE ANESTHETIST, CERTIFIED REGISTERED

## 2022-10-13 PROCEDURE — 74011250636 HC RX REV CODE- 250/636: Performed by: ANESTHESIOLOGY

## 2022-10-13 PROCEDURE — 84156 ASSAY OF PROTEIN URINE: CPT

## 2022-10-13 PROCEDURE — 93005 ELECTROCARDIOGRAM TRACING: CPT

## 2022-10-13 PROCEDURE — 84484 ASSAY OF TROPONIN QUANT: CPT

## 2022-10-13 PROCEDURE — 74011250637 HC RX REV CODE- 250/637: Performed by: OBSTETRICS & GYNECOLOGY

## 2022-10-13 PROCEDURE — G0378 HOSPITAL OBSERVATION PER HR: HCPCS

## 2022-10-13 PROCEDURE — 76010000392 HC C SECN EA ADDL 0.5 HR: Performed by: OBSTETRICS & GYNECOLOGY

## 2022-10-13 PROCEDURE — 77030007866 HC KT SPN ANES BBMI -B: Performed by: NURSE ANESTHETIST, CERTIFIED REGISTERED

## 2022-10-13 PROCEDURE — 59510 CESAREAN DELIVERY: CPT | Performed by: OBSTETRICS & GYNECOLOGY

## 2022-10-13 PROCEDURE — 2709999900 HC NON-CHARGEABLE SUPPLY

## 2022-10-13 PROCEDURE — 76060000078 HC EPIDURAL ANESTHESIA: Performed by: OBSTETRICS & GYNECOLOGY

## 2022-10-13 PROCEDURE — 99223 1ST HOSP IP/OBS HIGH 75: CPT | Performed by: SPECIALIST

## 2022-10-13 PROCEDURE — 65270000029 HC RM PRIVATE

## 2022-10-13 PROCEDURE — 76010000391 HC C SECN FIRST 1 HR: Performed by: OBSTETRICS & GYNECOLOGY

## 2022-10-13 PROCEDURE — 85025 COMPLETE CBC W/AUTO DIFF WBC: CPT

## 2022-10-13 PROCEDURE — 74011250636 HC RX REV CODE- 250/636

## 2022-10-13 PROCEDURE — 74011000250 HC RX REV CODE- 250: Performed by: OBSTETRICS & GYNECOLOGY

## 2022-10-13 PROCEDURE — 90000 NO LOS: CPT | Performed by: OBSTETRICS & GYNECOLOGY

## 2022-10-13 PROCEDURE — 36415 COLL VENOUS BLD VENIPUNCTURE: CPT

## 2022-10-13 RX ORDER — NALOXONE HYDROCHLORIDE 0.4 MG/ML
0.4 INJECTION, SOLUTION INTRAMUSCULAR; INTRAVENOUS; SUBCUTANEOUS AS NEEDED
Status: DISCONTINUED | OUTPATIENT
Start: 2022-10-13 | End: 2022-10-15 | Stop reason: HOSPADM

## 2022-10-13 RX ORDER — BUPIVACAINE HYDROCHLORIDE 7.5 MG/ML
INJECTION, SOLUTION EPIDURAL; RETROBULBAR AS NEEDED
Status: DISCONTINUED | OUTPATIENT
Start: 2022-10-13 | End: 2022-10-13 | Stop reason: HOSPADM

## 2022-10-13 RX ORDER — SODIUM CHLORIDE 0.9 % (FLUSH) 0.9 %
5-40 SYRINGE (ML) INJECTION EVERY 8 HOURS
Status: DISCONTINUED | OUTPATIENT
Start: 2022-10-13 | End: 2022-10-13 | Stop reason: HOSPADM

## 2022-10-13 RX ORDER — OXYCODONE HYDROCHLORIDE 5 MG/1
5 TABLET ORAL
Status: DISPENSED | OUTPATIENT
Start: 2022-10-13 | End: 2022-10-14

## 2022-10-13 RX ORDER — ACETAMINOPHEN 325 MG/1
650 TABLET ORAL
Status: DISCONTINUED | OUTPATIENT
Start: 2022-10-13 | End: 2022-10-15 | Stop reason: HOSPADM

## 2022-10-13 RX ORDER — HYDROCODONE BITARTRATE AND ACETAMINOPHEN 5; 325 MG/1; MG/1
2 TABLET ORAL
Status: DISCONTINUED | OUTPATIENT
Start: 2022-10-14 | End: 2022-10-15 | Stop reason: HOSPADM

## 2022-10-13 RX ORDER — HYDROCODONE BITARTRATE AND ACETAMINOPHEN 5; 325 MG/1; MG/1
1 TABLET ORAL
Status: DISCONTINUED | OUTPATIENT
Start: 2022-10-14 | End: 2022-10-15 | Stop reason: HOSPADM

## 2022-10-13 RX ORDER — OXYTOCIN/RINGER'S LACTATE 30/500 ML
87.3 PLASTIC BAG, INJECTION (ML) INTRAVENOUS AS NEEDED
Status: DISCONTINUED | OUTPATIENT
Start: 2022-10-13 | End: 2022-10-15 | Stop reason: HOSPADM

## 2022-10-13 RX ORDER — SODIUM CHLORIDE, SODIUM LACTATE, POTASSIUM CHLORIDE, CALCIUM CHLORIDE 600; 310; 30; 20 MG/100ML; MG/100ML; MG/100ML; MG/100ML
1000 INJECTION, SOLUTION INTRAVENOUS CONTINUOUS
Status: DISCONTINUED | OUTPATIENT
Start: 2022-10-13 | End: 2022-10-13 | Stop reason: HOSPADM

## 2022-10-13 RX ORDER — SODIUM CHLORIDE, SODIUM LACTATE, POTASSIUM CHLORIDE, CALCIUM CHLORIDE 600; 310; 30; 20 MG/100ML; MG/100ML; MG/100ML; MG/100ML
125 INJECTION, SOLUTION INTRAVENOUS CONTINUOUS
Status: DISCONTINUED | OUTPATIENT
Start: 2022-10-13 | End: 2022-10-15 | Stop reason: HOSPADM

## 2022-10-13 RX ORDER — FAMOTIDINE 20 MG/1
20 TABLET, FILM COATED ORAL DAILY
Status: DISCONTINUED | OUTPATIENT
Start: 2022-10-13 | End: 2022-10-15 | Stop reason: HOSPADM

## 2022-10-13 RX ORDER — SIMETHICONE 80 MG
80 TABLET,CHEWABLE ORAL AS NEEDED
Status: DISCONTINUED | OUTPATIENT
Start: 2022-10-13 | End: 2022-10-15 | Stop reason: HOSPADM

## 2022-10-13 RX ORDER — SODIUM CHLORIDE 0.9 % (FLUSH) 0.9 %
5-40 SYRINGE (ML) INJECTION AS NEEDED
Status: DISCONTINUED | OUTPATIENT
Start: 2022-10-13 | End: 2022-10-13 | Stop reason: HOSPADM

## 2022-10-13 RX ORDER — IBUPROFEN 800 MG/1
800 TABLET ORAL EVERY 8 HOURS
Status: DISCONTINUED | OUTPATIENT
Start: 2022-10-13 | End: 2022-10-15 | Stop reason: HOSPADM

## 2022-10-13 RX ORDER — KETOROLAC TROMETHAMINE 30 MG/ML
30 INJECTION, SOLUTION INTRAMUSCULAR; INTRAVENOUS
Status: DISCONTINUED | OUTPATIENT
Start: 2022-10-13 | End: 2022-10-13

## 2022-10-13 RX ORDER — EPHEDRINE SULFATE/0.9% NACL/PF 50 MG/5 ML
SYRINGE (ML) INTRAVENOUS AS NEEDED
Status: DISCONTINUED | OUTPATIENT
Start: 2022-10-13 | End: 2022-10-13 | Stop reason: HOSPADM

## 2022-10-13 RX ORDER — OXYCODONE HYDROCHLORIDE 5 MG/1
10 TABLET ORAL
Status: DISPENSED | OUTPATIENT
Start: 2022-10-13 | End: 2022-10-14

## 2022-10-13 RX ORDER — SODIUM CHLORIDE 0.9 % (FLUSH) 0.9 %
5-40 SYRINGE (ML) INJECTION EVERY 8 HOURS
Status: DISCONTINUED | OUTPATIENT
Start: 2022-10-13 | End: 2022-10-15 | Stop reason: HOSPADM

## 2022-10-13 RX ORDER — DIPHENHYDRAMINE HCL 25 MG
25 CAPSULE ORAL
Status: DISCONTINUED | OUTPATIENT
Start: 2022-10-13 | End: 2022-10-15 | Stop reason: HOSPADM

## 2022-10-13 RX ORDER — OXYTOCIN/RINGER'S LACTATE 30/500 ML
10 PLASTIC BAG, INJECTION (ML) INTRAVENOUS AS NEEDED
Status: DISCONTINUED | OUTPATIENT
Start: 2022-10-13 | End: 2022-10-15 | Stop reason: HOSPADM

## 2022-10-13 RX ORDER — MORPHINE SULFATE 0.5 MG/ML
INJECTION, SOLUTION EPIDURAL; INTRATHECAL; INTRAVENOUS AS NEEDED
Status: DISCONTINUED | OUTPATIENT
Start: 2022-10-13 | End: 2022-10-13 | Stop reason: HOSPADM

## 2022-10-13 RX ORDER — SODIUM CHLORIDE 0.9 % (FLUSH) 0.9 %
5-40 SYRINGE (ML) INJECTION AS NEEDED
Status: DISCONTINUED | OUTPATIENT
Start: 2022-10-13 | End: 2022-10-15 | Stop reason: HOSPADM

## 2022-10-13 RX ORDER — LOPERAMIDE HYDROCHLORIDE 2 MG/1
2 CAPSULE ORAL AS NEEDED
Status: DISCONTINUED | OUTPATIENT
Start: 2022-10-13 | End: 2022-10-15 | Stop reason: HOSPADM

## 2022-10-13 RX ORDER — HYDROMORPHONE HYDROCHLORIDE 1 MG/ML
0.5 INJECTION, SOLUTION INTRAMUSCULAR; INTRAVENOUS; SUBCUTANEOUS
Status: DISPENSED | OUTPATIENT
Start: 2022-10-13 | End: 2022-10-14

## 2022-10-13 RX ORDER — ONDANSETRON 4 MG/1
8 TABLET, ORALLY DISINTEGRATING ORAL
Status: DISCONTINUED | OUTPATIENT
Start: 2022-10-13 | End: 2022-10-15 | Stop reason: HOSPADM

## 2022-10-13 RX ORDER — FENTANYL CITRATE 50 UG/ML
INJECTION, SOLUTION INTRAMUSCULAR; INTRAVENOUS AS NEEDED
Status: DISCONTINUED | OUTPATIENT
Start: 2022-10-13 | End: 2022-10-13 | Stop reason: HOSPADM

## 2022-10-13 RX ORDER — ONDANSETRON 2 MG/ML
INJECTION INTRAMUSCULAR; INTRAVENOUS AS NEEDED
Status: DISCONTINUED | OUTPATIENT
Start: 2022-10-13 | End: 2022-10-13 | Stop reason: HOSPADM

## 2022-10-13 RX ORDER — PHENYLEPHRINE HCL IN 0.9% NACL 0.4MG/10ML
SYRINGE (ML) INTRAVENOUS AS NEEDED
Status: DISCONTINUED | OUTPATIENT
Start: 2022-10-13 | End: 2022-10-13 | Stop reason: HOSPADM

## 2022-10-13 RX ORDER — KETOROLAC TROMETHAMINE 30 MG/ML
30 INJECTION, SOLUTION INTRAMUSCULAR; INTRAVENOUS
Status: DISPENSED | OUTPATIENT
Start: 2022-10-13 | End: 2022-10-14

## 2022-10-13 RX ORDER — OXYTOCIN 10 [USP'U]/ML
INJECTION, SOLUTION INTRAMUSCULAR; INTRAVENOUS AS NEEDED
Status: DISCONTINUED | OUTPATIENT
Start: 2022-10-13 | End: 2022-10-13 | Stop reason: HOSPADM

## 2022-10-13 RX ORDER — DIPHENHYDRAMINE HYDROCHLORIDE 50 MG/ML
12.5 INJECTION, SOLUTION INTRAMUSCULAR; INTRAVENOUS
Status: ACTIVE | OUTPATIENT
Start: 2022-10-13 | End: 2022-10-14

## 2022-10-13 RX ORDER — DOCUSATE SODIUM 100 MG/1
100 CAPSULE, LIQUID FILLED ORAL 2 TIMES DAILY
Status: DISCONTINUED | OUTPATIENT
Start: 2022-10-13 | End: 2022-10-15 | Stop reason: HOSPADM

## 2022-10-13 RX ORDER — CALCIUM CARBONATE 200(500)MG
200 TABLET,CHEWABLE ORAL
Status: DISCONTINUED | OUTPATIENT
Start: 2022-10-13 | End: 2022-10-15 | Stop reason: HOSPADM

## 2022-10-13 RX ORDER — ONDANSETRON 2 MG/ML
4 INJECTION INTRAMUSCULAR; INTRAVENOUS
Status: DISCONTINUED | OUTPATIENT
Start: 2022-10-13 | End: 2022-10-15 | Stop reason: HOSPADM

## 2022-10-13 RX ADMIN — KETOROLAC TROMETHAMINE 30 MG: 30 INJECTION, SOLUTION INTRAMUSCULAR at 19:17

## 2022-10-13 RX ADMIN — Medication 10 MG: at 17:27

## 2022-10-13 RX ADMIN — SODIUM CHLORIDE, POTASSIUM CHLORIDE, SODIUM LACTATE AND CALCIUM CHLORIDE 125 ML/HR: 600; 310; 30; 20 INJECTION, SOLUTION INTRAVENOUS at 12:32

## 2022-10-13 RX ADMIN — FENTANYL CITRATE 15 MCG: 50 INJECTION, SOLUTION INTRAMUSCULAR; INTRAVENOUS at 17:17

## 2022-10-13 RX ADMIN — SODIUM CHLORIDE, POTASSIUM CHLORIDE, SODIUM LACTATE AND CALCIUM CHLORIDE 125 ML/HR: 600; 310; 30; 20 INJECTION, SOLUTION INTRAVENOUS at 18:13

## 2022-10-13 RX ADMIN — MORPHINE SULFATE 200 MCG: 0.5 INJECTION, SOLUTION EPIDURAL; INTRATHECAL; INTRAVENOUS at 17:17

## 2022-10-13 RX ADMIN — Medication 80 MCG: at 17:24

## 2022-10-13 RX ADMIN — FAMOTIDINE 20 MG: 20 TABLET, FILM COATED ORAL at 13:23

## 2022-10-13 RX ADMIN — BUPIVACAINE HYDROCHLORIDE 1.4 ML: 7.5 INJECTION, SOLUTION EPIDURAL; RETROBULBAR at 17:17

## 2022-10-13 RX ADMIN — Medication 10 MG: at 17:24

## 2022-10-13 RX ADMIN — ONDANSETRON 4 MG: 2 INJECTION INTRAMUSCULAR; INTRAVENOUS at 18:43

## 2022-10-13 RX ADMIN — Medication 10 MG: at 17:26

## 2022-10-13 RX ADMIN — Medication 10 MG: at 17:35

## 2022-10-13 RX ADMIN — SODIUM CHLORIDE, POTASSIUM CHLORIDE, SODIUM LACTATE AND CALCIUM CHLORIDE 999 ML/HR: 600; 310; 30; 20 INJECTION, SOLUTION INTRAVENOUS at 16:30

## 2022-10-13 RX ADMIN — OXYTOCIN 20 UNITS: 10 INJECTION, SOLUTION INTRAMUSCULAR; INTRAVENOUS at 18:08

## 2022-10-13 RX ADMIN — CEFAZOLIN SODIUM 2 G: 1 POWDER, FOR SOLUTION INTRAMUSCULAR; INTRAVENOUS at 17:18

## 2022-10-13 RX ADMIN — OXYTOCIN 20 UNITS: 10 INJECTION, SOLUTION INTRAMUSCULAR; INTRAVENOUS at 17:36

## 2022-10-13 RX ADMIN — HYDROMORPHONE HYDROCHLORIDE 0.5 MG: 1 INJECTION, SOLUTION INTRAMUSCULAR; INTRAVENOUS; SUBCUTANEOUS at 21:52

## 2022-10-13 RX ADMIN — ONDANSETRON HYDROCHLORIDE 4 MG: 2 SOLUTION INTRAMUSCULAR; INTRAVENOUS at 17:25

## 2022-10-13 NOTE — CONSULTS
Cardiology Initial Care Encounter    [address], [place], [address] [facility]  Phone [phone]; Fax [phone]            Patient: Trace Mueller MRN: [id number]     YOB: 1990  Age: 28 y.o. Sex: female      Admit Date: 10/13/2022       Assessment/Plan     Chest pain: This is unlikely to be cardiac. Pain is reproducible and positional so most likely MSK but may have an anxiety component. Has multiple ER visits in the past for the same presentation. EKG nonischemic. Trop not collected. - Collect troponin (will see if it an be added on)  - Obtain ECHO      CARDIOLOGY ATTENDING  Patient personally seen and examined. All the elements of history and examination were personally performed. Assessment and plan was discussed and agree. No current CP. NAD. Hrt RRR, lungs clear, abd soft, no edema, + pregnant     1) Non-cardiac CP   - Ms. Bahena presented with chest pain which seemed to be positional in nature, which has since resolved   - She has chest wall tenderness in the area where she had CP   - Her EKG  and cardiac exam are normal.   - Troponin is normal   - Have an ordered an echo (pending)   - I suspect her pain was likely non-cardiac     2) HTN  - BP mildly high at times but not high enough to require intervention           Ector Rogers MD, [facility] [place]              Reason for Consult:   Chest pain      History of Present Illness:     Trace Mueller is a 28 y.o. female   at 37w6d with PMH significant for cHTN, smoking, marijuana use and anxiety that was sent from [facility] office for chest pain and high blood pressure. Pain started last night when patient was watching TV. She was reportedly very anxious at the time before the pain started. It was 10/10 and sharp. Improved with lying on her side. No exacerbating factors. She did not seek medical attention or take any meds.  She showed up to her regular OB appointment today and was found to have a BP of 148/93 and continued to endorse chest pain and hence was sent for admission. She reports SOB due to pregnancy that remained unchanged during the episode. No other complaints or associated symptoms. Reports no h/o similar events though on chart review has multiple ER visits in  and 2013 for chest pain that was found to be d/t anxiety and MSK. She has been smoking <1ppd for 20 years and continues to do so. Does not drink EtOH. Smokes Marijuana occasionally, even during pregnancy. No personal cardiac hx except for HTN. Family Hx of DM and HTN. No strokes or MI's in parents. Fhx otherwise noncontributory. Past Medical History:     Past Medical History:   Diagnosis Date    Abnormal Pap smear     LGSIL-colpo    Depression affecting pregnancy 3/25/2022    Encounter for Papanicolaou smear for cervical cancer screening 2022    neg/hpv neg    Essential hypertension     blood pressure up in office today    HX OTHER MEDICAL     recurrent UTI       Patient Active Problem List   Diagnosis Code    Missed  O02.1    Gestational hypertension O13.9    H/O:  Z98.891    Prenatal care of multigravida, antepartum Z34.80    Depression affecting pregnancy O99.340, F32. A    Elevated blood pressure affecting pregnancy in third trimester, antepartum O16.3           Medications:     Current Facility-Administered Medications   Medication Dose Route Frequency    calcium carbonate (TUMS) chewable tablet 200 mg [elemental]  200 mg Oral TID WITH MEALS    famotidine (PEPCID) tablet 20 mg  20 mg Oral DAILY    lactated Ringers infusion  125 mL/hr IntraVENous CONTINUOUS         Allergies     No Known Allergies        Social History:     Social History     Tobacco Use    Smoking status: Every Day     Types: Cigarettes     Last attempt to quit: 2014     Years since quittin.2    Smokeless tobacco: Never   Substance Use Topics    Alcohol use: No         Family History:     Family History   Problem Relation Age of Onset Diabetes Mother     Hypertension Mother     Psychiatric Disorder Brother     Mental Retardation Brother     Heart Disease Maternal Grandmother     Hypertension Maternal Grandmother     Hypertension Maternal Grandfather     Diabetes Paternal Grandfather     Elevated Lipids Paternal Grandfather          Review of Systems:     Review of Symptoms:  Constitutional: Negative for fever, chills  HEENT: Negative for hearing or vision changes  Respiratory: Negative for cough, wheezing  Cardiovascular: Negative for orthopnea, claudication, syncope, and PND. Gastrointestinal: Negative for abdominal pain  Genitourinary: Negative for dysuria  Musculoskeletal: Negative for myalgias. Skin: Negative for rash  Heme: No problems bleeding  Neurological: Negative for speech change and focal weakness. Physical Exam:     Vitals:    10/13/22 1238 10/13/22 1243 10/13/22 1248 10/13/22 1253   BP:       Pulse:       Resp:       Temp:       SpO2: 97% 97% 97% 97%   Weight:       Height:          Last 3 Recorded Weights in this Encounter    10/13/22 1143   Weight: 175 lb (79.4 kg)       Intake and Output:  No intake or output data in the 24 hours ending 10/13/22 1413    Gen: Well-developed, well-nourished, gravid, in no acute distress  Neck: Supple,No JVD, No Carotid Bruit  Resp: No accessory muscle use, Clear breath sounds, No rales or rhonchi  Card: TTP over 5th sterocostal joints bilaterally. Regular Rate,Rythm,Normal S1, S2, No murmurs, rubs or gallop.    Abd:  Soft, non-tender, non-distended, BS+, gravid  MSK: No cyanosis  Skin: No rashes    Neuro: moving all four extremities , follows commands appropriately  Psych:  Good insight, oriented to person, place , alert, Nml Affect  LE: No edema        Labs:     Recent Results (from the past 24 hour(s))   EKG, 12 LEAD, INITIAL    Collection Time: 10/13/22 11:41 AM   Result Value Ref Range    Ventricular Rate 85 BPM    Atrial Rate 85 BPM    P-R Interval 130 ms    QRS Duration 70 ms    Q-T Interval 352 ms    QTC Calculation (Bezet) 418 ms    Calculated P Axis 43 degrees    Calculated R Axis 45 degrees    Calculated T Axis 6 degrees    Diagnosis       Normal sinus rhythm  Normal ECG  When compared with ECG of 19-SEP-2013 11:10,  No significant change was found     METABOLIC PANEL, COMPREHENSIVE    Collection Time: 10/13/22 12:29 PM   Result Value Ref Range    Sodium 139 136 - 145 mmol/L    Potassium 4.1 3.5 - 5.1 mmol/L    Chloride 108 97 - 108 mmol/L    CO2 23 21 - 32 mmol/L    Anion gap 8 5 - 15 mmol/L    Glucose 79 65 - 100 mg/dL    BUN 12 6 - 20 MG/DL    Creatinine 0.44 (L) 0.55 - 1.02 MG/DL    BUN/Creatinine ratio 27 (H) 12 - 20      eGFR >60 >60 ml/min/1.73m2    Calcium 9.0 8.5 - 10.1 MG/DL    Bilirubin, total 0.2 0.2 - 1.0 MG/DL    ALT (SGPT) 16 12 - 78 U/L    AST (SGOT) 15 15 - 37 U/L    Alk. phosphatase 124 (H) 45 - 117 U/L    Protein, total 6.0 (L) 6.4 - 8.2 g/dL    Albumin 2.8 (L) 3.5 - 5.0 g/dL    Globulin 3.2 2.0 - 4.0 g/dL    A-G Ratio 0.9 (L) 1.1 - 2.2     CBC WITH AUTOMATED DIFF    Collection Time: 10/13/22 12:29 PM   Result Value Ref Range    WBC 7.4 3.6 - 11.0 K/uL    RBC 3.43 (L) 3.80 - 5.20 M/uL    HGB 10.9 (L) 11.5 - 16.0 g/dL    HCT 32.7 (L) 35.0 - 47.0 %    MCV 95.3 80.0 - 99.0 FL    MCH 31.8 26.0 - 34.0 PG    MCHC 33.3 30.0 - 36.5 g/dL    RDW 13.0 11.5 - 14.5 %    PLATELET 998 996 - 124 K/uL    MPV 10.3 8.9 - 12.9 FL    NRBC 0.0 0  WBC    ABSOLUTE NRBC 0.00 0.00 - 0.01 K/uL    NEUTROPHILS 64 32 - 75 %    LYMPHOCYTES 25 12 - 49 %    MONOCYTES 7 5 - 13 %    EOSINOPHILS 1 0 - 7 %    BASOPHILS 1 0 - 1 %    IMMATURE GRANULOCYTES 2 (H) 0.0 - 0.5 %    ABS. NEUTROPHILS 4.8 1.8 - 8.0 K/UL    ABS. LYMPHOCYTES 1.8 0.8 - 3.5 K/UL    ABS. MONOCYTES 0.5 0.0 - 1.0 K/UL    ABS. EOSINOPHILS 0.1 0.0 - 0.4 K/UL    ABS. BASOPHILS 0.1 0.0 - 0.1 K/UL    ABS. IMM.  GRANS. 0.1 (H) 0.00 - 0.04 K/UL    DF AUTOMATED     PROTEIN/CREATININE RATIO, URINE    Collection Time: 10/13/22 12:29 PM   Result Value Ref Range    Protein, urine random 23 (H) 0.0 - 11.9 mg/dL    Creatinine, urine random 93.00 mg/dL    Protein/Creat. urine Ratio 0.2             Cardiology Data:     Cardiac Evaluation Includes:  ECHO 12/07/2012 - Normal. EF 60  EKG 10/13/2022 - Normal .NSR. Nonischemic.           Signed By: Trino Ayala MD     October 13, 2022

## 2022-10-13 NOTE — ANESTHESIA POSTPROCEDURE EVALUATION
Procedure(s):   SECTION. spinal    Anesthesia Post Evaluation      Multimodal analgesia: multimodal analgesia not used between 6 hours prior to anesthesia start to PACU discharge  Patient location during evaluation: bedside  Patient participation: complete - patient participated  Level of consciousness: awake  Pain management: adequate  Airway patency: patent  Anesthetic complications: no  Cardiovascular status: acceptable, blood pressure returned to baseline and hemodynamically stable  Respiratory status: acceptable  Hydration status: acceptable  Post anesthesia nausea and vomiting:  controlled      INITIAL Post-op Vital signs:   Vitals Value Taken Time   /84 10/13/22 1914   Temp 36.6 °C (97.9 °F) 10/13/22 181   Pulse 88 10/13/22 1914   Resp 16 10/13/22 1844   SpO2 99 % 10/13/22 1921   Vitals shown include unvalidated device data.

## 2022-10-13 NOTE — H&P
[address] [facility]  [email or web address]  [phone]    Agustina Ferrara MD, FACOG     Labor and Delivery History & Physical  Template updated 2971    Name: Anastasiya Herrera MRN: [id number]  SSN: [id number]    YOB: 1990  Age: 28 y.o. Sex: female        Subjective:     Estimated Date of Delivery: 10/29/22  OB History          3    Para   1    Term   1       0    AB   1    Living   1         SAB   0    IAB   0    Ectopic   0    Molar        Multiple   0    Live Births   1                Ms. Alfonso Mcwilliams is arrived to L and D from the office with a pregnancy at 37w5d for  elevated BP and chest pain . Prenatal course CHTN. Please see prenatal records for details. Patient reports good fetal movement, no loss or gush of fluid, no vaginal bleeding, no significant or increase in vaginal discharge   She co abdominal pain on and off but no clear contractions  Patient denies shortness of breath, right upper quadrant pain, vision changes  She reports a frontal headache and persistent dizziness on and off x weeks. She co CP in middle of chest that is sharp x last night. Nothing makes it better or worse. Position changes or food does not help. Patient reports she missed MFM fu Tuesday. Past Medical History:   Diagnosis Date    Abnormal Pap smear     LGSIL-colpo    Depression affecting pregnancy 3/25/2022    Encounter for Papanicolaou smear for cervical cancer screening 2022    neg/hpv neg    Essential hypertension     blood pressure up in office today    HX OTHER MEDICAL     recurrent UTI     No past surgical history on file.   Social History     Socioeconomic History    Marital status: SINGLE   Tobacco Use    Smoking status: Every Day     Types: Cigarettes     Last attempt to quit: 2014     Years since quittin.2    Smokeless tobacco: Never   Substance and Sexual Activity    Alcohol use: No    Drug use: Yes     Types: Marijuana Sexual activity: Yes     Partners: Male     Birth control/protection: None   Social History Narrative    ** Merged History Encounter **          Family History   Problem Relation Age of Onset    Diabetes Mother     Hypertension Mother     Psychiatric Disorder Brother     Mental Retardation Brother     Heart Disease Maternal Grandmother     Hypertension Maternal Grandmother     Hypertension Maternal Grandfather     Diabetes Paternal Grandfather     Elevated Lipids Paternal Grandfather        No Known Allergies  Prior to Admission medications    Medication Sig Start Date End Date Taking? Authorizing Provider   Ferrous Fumarate 325 mg (106 mg iron) tab Take 1 Tablet by mouth. Yes Provider, Historical   BABY ASPIRIN PO Take  by mouth. Yes Provider, Historical   magnesium 250 mg tab Take  by mouth. Yes Provider, Historical   acetaminophen (TYLENOL) 325 mg tablet Take  by mouth every four (4) hours as needed for Pain. Yes Provider, Historical   calcium carbonate (TUMS) 200 mg calcium (500 mg) chew Take 1 Tablet by mouth daily. Yes Provider, Historical   promethazine (PHENERGAN) 12.5 mg tablet Take 1 Tablet by mouth every six (6) hours as needed for Nausea (patient may take 1/2 dose if causing drowsiness. ). 5/20/22  Yes Annie Adair MD   escitalopram oxalate (LEXAPRO) 10 mg tablet Take 5 mg by mouth every other day. 3/12/22  Yes Provider, Historical   prenatal vit-iron fumarate-fa 27 mg iron- 0.8 mg tab tablet Take 1 Tablet by mouth daily. Yes Other, MD Arcelia   promethazine (PHENERGAN) 12.5 mg tablet Take 1 Tablet by mouth every six (6) hours as needed for Nausea for up to 30 days.   Patient not taking: Reported on 10/13/2022 9/15/22 10/15/22  Annie Adair MD        Active Hospital Problems    Diagnosis Date Noted    Elevated blood pressure affecting pregnancy in third trimester, antepartum 10/13/2022       Review of Systems: A comprehensive review of systems was negative except for that written in the HPI.  Constitutional:  see HPI  ENT ROS: negative for - hearing change, oral lesions or visual changes  Respiratory: negative for cough, wheezing or dyspnea on exertion  Cardiovascular: see HPI  Gastrointestinal: negative for dysphagia, nausea and vomiting  Genito-Urinary ROS: see HPI  Inteument/breast: negative for rash, breast lump and nipple discharge  Musculoskeletal:negative for stiff joints, neck pain and muscle weakness  Endocrine ROS: negative for - breast changes, galactorrhea or temperature intolerance  Hematological and Lymphatic ROS: negative for - bruising or swollen lymph nodes      Objective:     Vitals:  No data found. Physical Exam:  Patient without distress.   Heart: Regular rate and rhythm or S1S2 present  Lung: clear to auscultation throughout lung fields, no wheezes, no rales, no rhonchi and normal respiratory effort  Abdomen: soft, nontender  Fundus: soft and non tender  Cervical Exam: closed in office  Lower Extremities: no c/t trace edema b/l    Membranes:  Intact      Recent Results (from the past 12 hour(s))   EKG, 12 LEAD, INITIAL    Collection Time: 10/13/22 11:41 AM   Result Value Ref Range    Ventricular Rate 85 BPM    Atrial Rate 85 BPM    P-R Interval 130 ms    QRS Duration 70 ms    Q-T Interval 352 ms    QTC Calculation (Bezet) 418 ms    Calculated P Axis 43 degrees    Calculated R Axis 45 degrees    Calculated T Axis 6 degrees    Diagnosis       Normal sinus rhythm  Normal ECG  When compared with ECG of 19-SEP-2013 11:10,  No significant change was found     CBC WITH AUTOMATED DIFF    Collection Time: 10/13/22 12:29 PM   Result Value Ref Range    WBC 7.4 3.6 - 11.0 K/uL    RBC 3.43 (L) 3.80 - 5.20 M/uL    HGB 10.9 (L) 11.5 - 16.0 g/dL    HCT 32.7 (L) 35.0 - 47.0 %    MCV 95.3 80.0 - 99.0 FL    MCH 31.8 26.0 - 34.0 PG    MCHC 33.3 30.0 - 36.5 g/dL    RDW 13.0 11.5 - 14.5 %    PLATELET 419 191 - 382 K/uL    MPV 10.3 8.9 - 12.9 FL    NRBC 0.0 0  WBC    ABSOLUTE NRBC 0.00 0.00 - 0.01 K/uL    NEUTROPHILS 64 32 - 75 %    LYMPHOCYTES 25 12 - 49 %    MONOCYTES 7 5 - 13 %    EOSINOPHILS 1 0 - 7 %    BASOPHILS 1 0 - 1 %    IMMATURE GRANULOCYTES 2 (H) 0.0 - 0.5 %    ABS. NEUTROPHILS 4.8 1.8 - 8.0 K/UL    ABS. LYMPHOCYTES 1.8 0.8 - 3.5 K/UL    ABS. MONOCYTES 0.5 0.0 - 1.0 K/UL    ABS. EOSINOPHILS 0.1 0.0 - 0.4 K/UL    ABS. BASOPHILS 0.1 0.0 - 0.1 K/UL    ABS. IMM. GRANS. 0.1 (H) 0.00 - 0.04 K/UL    DF AUTOMATED             Assessment/Plan:   28 y.o.  37w5d  CHTN, ro SIPIH, +HA/dizziness/CP  Ho CS< plan repeat: last ate this am   Chest pain x 1day, no acute distress   Pt does have anemia, likely iron deficiency  GBS pos  Depression, stable      Reassuring fetal surveillance    Past Medical History:   Diagnosis Date    Abnormal Pap smear     LGSIL-colpo    Depression affecting pregnancy 3/25/2022    Encounter for Papanicolaou smear for cervical cancer screening 2022    neg/hpv neg    Essential hypertension     blood pressure up in office today    HX OTHER MEDICAL     recurrent UTI         Plan: Admit for  [facility]  labs, EKG . CEFM/TOCO  Consider cardiology consult, pt reports she saw someone in the past, but not sure what DrMercedez, and has not seen anyone recently. Trial of meds for GERD to see if helps with CP sx  NPO x meds  Consider R CS  Serial BP    Signed By:  Dori Watson MD     2022         NST Inpatient Procedure Note    Anastasiay Herrera presents for fetal non-stress test.    Indication is CHTN, headache. She is 37w5d. She has been monitored for more than 60 minutes. The FHR was reactive. NST Interpretation:   FHR baseline 120 bpm,   Variability:  moderate  Accelerations:  present  Decelerations Absent. Uterine contractions:   irregular    Assessment  NST is reactive. NST is reassuring. Patient does need admission/observation for further monitoring. Keiko Inna was informed of the NST results and her questions were answered.     Plan:    [] Continue admission to labor and delivery   PIH labs, EKG   [] Continue observation   [] Keep routine OB follow up upon discharge   [] Reviewed fetal movement kick counts, notify MD if decreased   []    []          On this date, 10/13/2022,  I have spent 25 minutes reviewing previous notes, examining the patient, reviewing test results and face to face time with the patient discussing the diagnosis, plan of care and importance of compliance with the treatment plan and performed an exam.  We reviewed the planned hospital course as well as documented on the day of the hospitalization.

## 2022-10-13 NOTE — BRIEF OP NOTE
Brief Postoperative Note    Patient: Coleen Coulter  YOB: 1990  MRN: [id number]    Date of Procedure: 10/13/2022     Pre-Op Diagnosis: Term pregnancy, repeat [Z34.90], GHTN vs CHTN with elevated BP    Post-Op Diagnosis: Same as preoperative diagnosis.       Procedure(s):   SECTION    Surgeon(s):  Lali Daniel MD    Surgical Assistant: Surg Asst-1: Belkys Potter    Anesthesia: Spinal     Estimated Blood Loss (mL): 667 cc    Complications: None    Specimens: * No specimens in log *     Implants: * No implants in log *    Drains: * No LDAs found *    Findings: LBFI, clear amniotic fluid    Electronically Signed by Donavan Meyer MD on 10/13/2022 at 4:55 PM

## 2022-10-13 NOTE — PROGRESS NOTES
1122 - Patient presents to labor and delivery, walked to room and changing    1139 - Finished changing, placed on monitor, Sent from office for elevated blood pressures    1210- Dr. Daily Kitchen at bedside, discussing plan of care. Orders received for labs, EKG, Cardiology consult and fluids. 1241 - EKG done, placed on chart    1242 - Cardiologist contacted via perfect serve for consult    [phone] - Cardiology responded, Dr. Kimberlyn Kelsey to see patient     1330 -  Dr. Dima Cloud to bedside to assess patient and discuss plan of care    1405 - Dr. Kimberlyn Kelsey and Dr. Dima Cloud at bedside to see patient. Troponin to be drawn    1515 Verbal shift change report given to Cori Gtz (oncoming nurse) by Patti De Guzman (offgoing nurse). Report included the following information SBAR, Intake/Output, MAR, and Recent Results.

## 2022-10-13 NOTE — PROGRESS NOTES
Visit Vitals  /85   Pulse 80   Temp 98.3 °F (36.8 °C)   Resp 16   Ht 5' 7\" (1.702 m)   Wt 175 lb (79.4 kg)   SpO2 97%   Breastfeeding No   BMI 27.41 kg/m²     Patient Vitals for the past 24 hrs:   Temp Pulse Resp BP SpO2   10/13/22 1326 -- 80 16 123/85 --   10/13/22 1253 -- -- -- -- 97 %   10/13/22 1248 -- -- -- -- 97 %   10/13/22 1243 -- -- -- -- 97 %   10/13/22 1238 -- -- -- -- 97 %   10/13/22 1227 -- 80 -- (!) 140/85 --   10/13/22 1225 -- -- -- -- 98 %   10/13/22 1220 -- -- -- -- 98 %   10/13/22 1200 98.3 °F (36.8 °C) 88 20 130/82 97 %     Recent Results (from the past 24 hour(s))   EKG, 12 LEAD, INITIAL    Collection Time: 10/13/22 11:41 AM   Result Value Ref Range    Ventricular Rate 85 BPM    Atrial Rate 85 BPM    P-R Interval 130 ms    QRS Duration 70 ms    Q-T Interval 352 ms    QTC Calculation (Bezet) 418 ms    Calculated P Axis 43 degrees    Calculated R Axis 45 degrees    Calculated T Axis 6 degrees    Diagnosis       Normal sinus rhythm  Normal ECG  When compared with ECG of 19-SEP-2013 11:10,  No significant change was found     METABOLIC PANEL, COMPREHENSIVE    Collection Time: 10/13/22 12:29 PM   Result Value Ref Range    Sodium 139 136 - 145 mmol/L    Potassium 4.1 3.5 - 5.1 mmol/L    Chloride 108 97 - 108 mmol/L    CO2 23 21 - 32 mmol/L    Anion gap 8 5 - 15 mmol/L    Glucose 79 65 - 100 mg/dL    BUN 12 6 - 20 MG/DL    Creatinine 0.44 (L) 0.55 - 1.02 MG/DL    BUN/Creatinine ratio 27 (H) 12 - 20      eGFR >60 >60 ml/min/1.73m2    Calcium 9.0 8.5 - 10.1 MG/DL    Bilirubin, total 0.2 0.2 - 1.0 MG/DL    ALT (SGPT) 16 12 - 78 U/L    AST (SGOT) 15 15 - 37 U/L    Alk.  phosphatase 124 (H) 45 - 117 U/L    Protein, total 6.0 (L) 6.4 - 8.2 g/dL    Albumin 2.8 (L) 3.5 - 5.0 g/dL    Globulin 3.2 2.0 - 4.0 g/dL    A-G Ratio 0.9 (L) 1.1 - 2.2     CBC WITH AUTOMATED DIFF    Collection Time: 10/13/22 12:29 PM   Result Value Ref Range    WBC 7.4 3.6 - 11.0 K/uL    RBC 3.43 (L) 3.80 - 5.20 M/uL    HGB 10.9 (L) 11.5 - 16.0 g/dL    HCT 32.7 (L) 35.0 - 47.0 %    MCV 95.3 80.0 - 99.0 FL    MCH 31.8 26.0 - 34.0 PG    MCHC 33.3 30.0 - 36.5 g/dL    RDW 13.0 11.5 - 14.5 %    PLATELET 419 792 - 789 K/uL    MPV 10.3 8.9 - 12.9 FL    NRBC 0.0 0  WBC    ABSOLUTE NRBC 0.00 0.00 - 0.01 K/uL    NEUTROPHILS 64 32 - 75 %    LYMPHOCYTES 25 12 - 49 %    MONOCYTES 7 5 - 13 %    EOSINOPHILS 1 0 - 7 %    BASOPHILS 1 0 - 1 %    IMMATURE GRANULOCYTES 2 (H) 0.0 - 0.5 %    ABS. NEUTROPHILS 4.8 1.8 - 8.0 K/UL    ABS. LYMPHOCYTES 1.8 0.8 - 3.5 K/UL    ABS. MONOCYTES 0.5 0.0 - 1.0 K/UL    ABS. EOSINOPHILS 0.1 0.0 - 0.4 K/UL    ABS. BASOPHILS 0.1 0.0 - 0.1 K/UL    ABS. IMM. GRANS. 0.1 (H) 0.00 - 0.04 K/UL    DF AUTOMATED     PROTEIN/CREATININE RATIO, URINE    Collection Time: 10/13/22 12:29 PM   Result Value Ref Range    Protein, urine random 23 (H) 0.0 - 11.9 mg/dL    Creatinine, urine random 93.00 mg/dL    Protein/Creat.  urine Ratio 0.2     TROPONIN-HIGH SENSITIVITY    Collection Time: 10/13/22  2:18 PM   Result Value Ref Range    Troponin-High Sensitivity 6 0 - 51 ng/L

## 2022-10-13 NOTE — PROGRESS NOTES
Patient feeling better. HA resolved  CP resolved. Visit Vitals  BP (!) 141/96   Pulse 93   Temp 98.7 °F (37.1 °C)   Resp 16   Ht 5' 7\" (1.702 m)   Wt 175 lb (79.4 kg)   SpO2 96%   Breastfeeding No   BMI 27.41 kg/m²       Will proceed with rp CS due to CHTN/GHTN with elevated BP.    R CS  I discussed the risks of the procedure including bleeding, infection, wound healing problems, blood clots, injury to internal organs including her bladder or bowel, reaction to the surgical prep or reaction to local and general anesthetic. I also discussed alternatives to surgery including not having surgery. She understands the risks, benefits and alternatives to the procedure; any and all questions were answered to her satisfaction.   She want to proceed with a: r CS Bing Schwab, MD

## 2022-10-13 NOTE — OP NOTES
[address] [facility] OB-[facility]  [email or web address]  [phone]    Nurys Chaudhary MD, FACOG       Pre-operative Diagnosis: Term pregnancy, repeat [Z34.90]  GHTN vs CHTN with elevated BP at term    Post-operative Diagnosis: Term pregnancy, repeat    Procedure: Low transverse  section    Surgeon: Nurys Chaudhary MD    Assistant: labor and delivery staff, Surg Asst-1: Pepito Pillow    EBL: 700 cc    Urine output: per anesthesia records    Anesthesia: Spinal    Prophylactic Antibiotics: Ancef    DVT Prophylaxis: Sequential Compression Devices    Complications: none    Implants: none      Procedure Detail:      After proper patient identification and consent, the patient was taken to the operating room, where spinal anesthesia was administered and found to be adequate. A gracia catheter had been previously placed using sterile technique. The patient was prepped and draped in the normal sterile fashion for abdominopelvic surgery. A Pfannenstiel skin incision was made with a scalpel and carried down to the rectus fascia with blunt and sharp dissection. The rectus fascia was opened in the midline with the scalpel and extended laterally with the Ewing scissors. The superior aspect of the fascial incision was then grasped with two Kocher clamps, elevated and the underlying rectus muscles were dissected off with blunt and sharp dissection. In a similar fashion, the inferior aspect of the fascial incision was grasped with two Kocher clamps and the underlying rectus muscle were dissected off with blunt and sharp dissection. The rectus muscles were  bluntly in the midline. The peritoneum was elevated and entered bluntly well superior to the bladder without any apparent injury. The Werner retractor was inserted in the standard fashion with care to check for any unintended tissue under the intraabdominal ring.   The bladder flap was created without difficulty with blunt and sharp dissection and the bladder blade was replaced. A low transverse uterine incision was made with the scalpel and extended with digital traction. Hysterotomy and aminotomy was performed and the fluid was medium amount clear. The 's hand was placed into the hysterotomy and the 's vertex was grasped, flexed, and brought atraumatically through the hysterotomy incision. The nose and mouth were bulb suctioned. The rest of the baby was delivered without difficulty. The cord was doubly clamped and cut and the  was handed off to Nursing staff in attendance. The placenta was then removed from the uterus. The uterus was curettaged with a moist lap pad and cleared of all clots and debris and removed from the pelvis. The uterine incision was closed with 0 Vicryl suture, first in a running locking fashion, followed by a second embricating layer, with good hemostasis was obtained. The lateral gutters were irrigated with warm normal saline and clot and debris was removed and normal tubes and ovaries were noted bilaterally. The uterus was returned to the abdomen. The incision was reevaluated and good hemostasis was again reassured. The rectus muscles were then reapproximated with a 2-0 Vicryl in a horizontal mattress suture. The fascia was closed with #1 Vicryl in a running fashion. Good hemostasis of the fascial incision was assured. The subcutaneous tissue was irrigated and hemostasis of the overlying subcutaneous tissue was assured with pressure and the bovie. The skin was closed with a 3-0 Monocryl subcuticular closure and reinforced with steristrips. The patient tolerated the procedure well. Sponge, lap, and needle counts were correct times three and the patient and baby were taken to recovery/postpartum room in stable condition.       Shahid John MD    2022  4:55 PM

## 2022-10-13 NOTE — PROGRESS NOTES
1518 Bedside and Verbal shift change report given to [address] [place] [address]. 2800 (oncoming nurse) by Dede Raymundo (offgoing nurse). Report included the following information SBAR, Procedure Summary, Intake/Output, MAR, Recent Results, and Med Rec Status. [address] entered and verified by Gia Painter RN.     1600 Call from Dr. Daily Kitchen notifying pt will have c section today. Pt last ate at 0900 today, call made to anesthesia, planning for  c/s between [id number]. Dr. Daily Kitchen updated, pt made aware and agreeable. LR Bolus started. 1707 Pt ambulated to OR 2 with RN and fidanae. Ancef given to EVE campbell. Spinal completed.  post spinal insertion. See OR summary. 0 Delivery of viable female infant via repeat LTCS. Apgars 9/9. Pt tolerated procedure well. Bleeding stable, fundus firm. Delivery QBL: 720 ml    1809 Pt transferred to stretcher, abd binder placed under patient. Pt wheeled out of OR to room 211 with RN and CRNA. 1855 Bedside and Verbal shift change report given to Anita Alexander RN (oncoming nurse) by Boy Funes RN (offgoing nurse). Report included the following information SBAR, Procedure Summary, Intake/Output, MAR, Recent Results, and Med Rec Status.

## 2022-10-13 NOTE — PROGRESS NOTES
_ 164 [facility] OB-GYN  [email or web address]/  [phone]       Follow-up OB visit    Chief Complaint   Patient presents with    Routine Prenatal Visit       Patient Active Problem List    Diagnosis Date Noted    Elevated blood pressure affecting pregnancy in third trimester, antepartum 10/13/2022    H/O:  2022    Prenatal care of multigravida, antepartum 2022    Depression affecting pregnancy 2022    Gestational hypertension 2012    Missed  2011        The patient reports the following concerns: chest discomfort       Vitals:    10/13/22 1043 10/13/22 1047   BP: (!) 149/95 (!) 148/92   Weight: 175 lb (79.4 kg)      See PN flowsheet for exam    28 y.o.  37w5d   Encounter Diagnoses   Name Primary? Elevated blood pressure affecting pregnancy in third trimester, antepartum Yes    Prenatal care of multigravida, antepartum     Depression affecting pregnancy     H/O:         To LD, PIH labs     [] SAB/bleeding precautions reviewed   [] PTL/PPROM precautions reviewed   [] Labor precautions reviewed   [] Fetal kick counts discussed   [] Labs reviewed with patient   [] Derrel Moran precautions reviewed   [] Consent reviewed   [] Handouts given to pt   [] Glucola handout    [] GBS/labor/Magic Hour handout   []    []    [] We reviewed CDC recommendations for Tdap for patient and close contacts and RBA of receiving in pregnancy, advised obtaining in third trimester   [] Reviewed healthy nutrition in pregnancy and good exercise practices   [] We disc safer medications in pregnancy and referred patient to [facility] resources   [] We reviewed CDC recommendations for flu vaccine and RBA of receiving in pregnancy   [] Flu vaccine recommendations reviewed   [] COVID vaccine/booster recommendations reviewed   []           No orders of the defined types were placed in this encounter.       Aliza Bland MD

## 2022-10-13 NOTE — ANESTHESIA PREPROCEDURE EVALUATION
Relevant Problems   NEUROLOGY   (+) Depression affecting pregnancy      CARDIOVASCULAR   (+) Gestational hypertension       Anesthetic History   No history of anesthetic complications            Review of Systems / Medical History  Patient summary reviewed and pertinent labs reviewed    Pulmonary  Within defined limits                 Neuro/Psych              Cardiovascular  Within defined limits  Hypertension              Exercise tolerance: >4 METS     GI/Hepatic/Renal  Within defined limits              Endo/Other  Within defined limits           Other Findings              Physical Exam    Airway  Mallampati: II  TM Distance: 4 - 6 cm  Neck ROM: normal range of motion   Mouth opening: Normal     Cardiovascular    Rhythm: regular  Rate: normal         Dental    Dentition: Upper dentition intact and Lower dentition intact     Pulmonary  Breath sounds clear to auscultation               Abdominal         Other Findings            Anesthetic Plan    ASA: 3  Anesthesia type: spinal          Induction: Intravenous  Anesthetic plan and risks discussed with: Patient

## 2022-10-13 NOTE — ANESTHESIA PROCEDURE NOTES
Spinal Block    Start time: 10/13/2022 5:14 PM  End time: 10/13/2022 5:17 PM  Performed by: Dequan Alexandra CRNA  Authorized by: Libertad Umana MD     Pre-procedure:   Indications: primary anesthetic  Preanesthetic Checklist: patient identified, risks and benefits discussed, anesthesia consent, site marked, patient being monitored, timeout performed and fire risk safety assessment completed and verbalized    Timeout Time: 17:14 EDT      Spinal Block:   Patient Position:  Seated        Location:  L3-4  Technique:  Single shot      Med Admin Time: 10/13/2022 5:17 PM    Needle:   Needle Type:  Pencan  Needle Gauge:  25 G  Attempts:  1      Events: CSF confirmed        Assessment:  Insertion:  Complicated

## 2022-10-14 ENCOUNTER — APPOINTMENT (OUTPATIENT)
Dept: NON INVASIVE DIAGNOSTICS | Age: 32
DRG: 540 | End: 2022-10-14
Attending: STUDENT IN AN ORGANIZED HEALTH CARE EDUCATION/TRAINING PROGRAM
Payer: MEDICAID

## 2022-10-14 PROBLEM — Z34.90 PREGNANCY: Status: ACTIVE | Noted: 2022-10-14

## 2022-10-14 LAB
BASOPHILS # BLD: 0 K/UL (ref 0–0.1)
BASOPHILS NFR BLD: 1 % (ref 0–1)
DIFFERENTIAL METHOD BLD: ABNORMAL
ECHO AO ARCH DIAM: 1.6 CM
ECHO AO ASC DIAM: 2.6 CM
ECHO AO ASCENDING AORTA INDEX: 1.36 CM/M2
ECHO AV AREA PEAK VELOCITY: 2.2 CM2
ECHO AV AREA/BSA PEAK VELOCITY: 1.2 CM2/M2
ECHO AV PEAK GRADIENT: 7 MMHG
ECHO AV PEAK VELOCITY: 1.4 M/S
ECHO AV VELOCITY RATIO: 0.64
ECHO LA DIAMETER INDEX: 1.41 CM/M2
ECHO LA DIAMETER: 2.7 CM
ECHO LA VOL 2C: 46 ML (ref 22–52)
ECHO LA VOL 4C: 36 ML (ref 22–52)
ECHO LA VOL BP: 42 ML (ref 22–52)
ECHO LA VOL/BSA BIPLANE: 22 ML/M2 (ref 16–34)
ECHO LA VOLUME AREA LENGTH: 48 ML
ECHO LA VOLUME INDEX A2C: 24 ML/M2 (ref 16–34)
ECHO LA VOLUME INDEX A4C: 19 ML/M2 (ref 16–34)
ECHO LA VOLUME INDEX AREA LENGTH: 25 ML/M2 (ref 16–34)
ECHO LV E' LATERAL VELOCITY: 14 CM/S
ECHO LV E' SEPTAL VELOCITY: 12 CM/S
ECHO LV EDV A2C: 130 ML
ECHO LV EDV A4C: 96 ML
ECHO LV EDV BP: 112 ML (ref 56–104)
ECHO LV EDV INDEX A4C: 50 ML/M2
ECHO LV EDV INDEX BP: 59 ML/M2
ECHO LV EDV NDEX A2C: 68 ML/M2
ECHO LV EJECTION FRACTION A2C: 56 %
ECHO LV EJECTION FRACTION A4C: 53 %
ECHO LV EJECTION FRACTION BIPLANE: 55 % (ref 55–100)
ECHO LV ESV A2C: 57 ML
ECHO LV ESV A4C: 45 ML
ECHO LV ESV BP: 51 ML (ref 19–49)
ECHO LV ESV INDEX A2C: 30 ML/M2
ECHO LV ESV INDEX A4C: 24 ML/M2
ECHO LV ESV INDEX BP: 27 ML/M2
ECHO LV FRACTIONAL SHORTENING: 17 % (ref 28–44)
ECHO LV INTERNAL DIMENSION DIASTOLE INDEX: 2.46 CM/M2
ECHO LV INTERNAL DIMENSION DIASTOLIC: 4.7 CM (ref 3.9–5.3)
ECHO LV INTERNAL DIMENSION SYSTOLIC INDEX: 2.04 CM/M2
ECHO LV INTERNAL DIMENSION SYSTOLIC: 3.9 CM
ECHO LV IVSD: 0.9 CM (ref 0.6–0.9)
ECHO LV MASS 2D: 153.4 G (ref 67–162)
ECHO LV MASS INDEX 2D: 80.3 G/M2 (ref 43–95)
ECHO LV POSTERIOR WALL DIASTOLIC: 1 CM (ref 0.6–0.9)
ECHO LV RELATIVE WALL THICKNESS RATIO: 0.43
ECHO LVOT AREA: 3.1 CM2
ECHO LVOT DIAM: 2 CM
ECHO LVOT MEAN GRADIENT: 2 MMHG
ECHO LVOT PEAK GRADIENT: 3 MMHG
ECHO LVOT PEAK VELOCITY: 0.9 M/S
ECHO LVOT STROKE VOLUME INDEX: 30.9 ML/M2
ECHO LVOT SV: 59 ML
ECHO LVOT VTI: 18.8 CM
ECHO MV A VELOCITY: 0.57 M/S
ECHO MV E DECELERATION TIME (DT): 99.4 MS
ECHO MV E VELOCITY: 0.77 M/S
ECHO MV E/A RATIO: 1.35
ECHO MV E/E' LATERAL: 5.5
ECHO MV E/E' RATIO (AVERAGED): 5.96
ECHO MV E/E' SEPTAL: 6.42
ECHO PULMONARY ARTERY END DIASTOLIC PRESSURE: 5 MMHG
ECHO PV MAX VELOCITY: 1 M/S
ECHO PV PEAK GRADIENT: 4 MMHG
ECHO PV REGURGITANT MAX VELOCITY: 1.2 M/S
ECHO RV FREE WALL PEAK S': 10 CM/S
ECHO RV INTERNAL DIMENSION: 4.2 CM
ECHO RV TAPSE: 2.3 CM (ref 1.7–?)
ECHO TV REGURGITANT MAX VELOCITY: 2.47 M/S
ECHO TV REGURGITANT PEAK GRADIENT: 25 MMHG
EOSINOPHIL # BLD: 0.1 K/UL (ref 0–0.4)
EOSINOPHIL NFR BLD: 1 % (ref 0–7)
ERYTHROCYTE [DISTWIDTH] IN BLOOD BY AUTOMATED COUNT: 12.9 % (ref 11.5–14.5)
HCT VFR BLD AUTO: 27.7 % (ref 35–47)
HGB BLD-MCNC: 9.3 G/DL (ref 11.5–16)
IMM GRANULOCYTES # BLD AUTO: 0.1 K/UL (ref 0–0.04)
IMM GRANULOCYTES NFR BLD AUTO: 1 % (ref 0–0.5)
LYMPHOCYTES # BLD: 1.5 K/UL (ref 0.8–3.5)
LYMPHOCYTES NFR BLD: 18 % (ref 12–49)
MCH RBC QN AUTO: 31.8 PG (ref 26–34)
MCHC RBC AUTO-ENTMCNC: 33.6 G/DL (ref 30–36.5)
MCV RBC AUTO: 94.9 FL (ref 80–99)
MONOCYTES # BLD: 0.5 K/UL (ref 0–1)
MONOCYTES NFR BLD: 6 % (ref 5–13)
NEUTS SEG # BLD: 6.3 K/UL (ref 1.8–8)
NEUTS SEG NFR BLD: 73 % (ref 32–75)
NRBC # BLD: 0 K/UL (ref 0–0.01)
NRBC BLD-RTO: 0 PER 100 WBC
PLATELET # BLD AUTO: 171 K/UL (ref 150–400)
PMV BLD AUTO: 10.4 FL (ref 8.9–12.9)
RBC # BLD AUTO: 2.92 M/UL (ref 3.8–5.2)
WBC # BLD AUTO: 8.5 K/UL (ref 3.6–11)

## 2022-10-14 PROCEDURE — 74011250637 HC RX REV CODE- 250/637: Performed by: ANESTHESIOLOGY

## 2022-10-14 PROCEDURE — 74011250637 HC RX REV CODE- 250/637: Performed by: OBSTETRICS & GYNECOLOGY

## 2022-10-14 PROCEDURE — 65270000029 HC RM PRIVATE

## 2022-10-14 PROCEDURE — 74011250636 HC RX REV CODE- 250/636: Performed by: NURSE ANESTHETIST, CERTIFIED REGISTERED

## 2022-10-14 PROCEDURE — 74011250636 HC RX REV CODE- 250/636: Performed by: OBSTETRICS & GYNECOLOGY

## 2022-10-14 PROCEDURE — 85025 COMPLETE CBC W/AUTO DIFF WBC: CPT

## 2022-10-14 PROCEDURE — 93306 TTE W/DOPPLER COMPLETE: CPT | Performed by: STUDENT IN AN ORGANIZED HEALTH CARE EDUCATION/TRAINING PROGRAM

## 2022-10-14 PROCEDURE — 2709999900 HC NON-CHARGEABLE SUPPLY

## 2022-10-14 PROCEDURE — B246ZZ4 ULTRASONOGRAPHY OF RIGHT AND LEFT HEART, TRANSESOPHAGEAL: ICD-10-PCS | Performed by: STUDENT IN AN ORGANIZED HEALTH CARE EDUCATION/TRAINING PROGRAM

## 2022-10-14 PROCEDURE — 36415 COLL VENOUS BLD VENIPUNCTURE: CPT

## 2022-10-14 PROCEDURE — 93306 TTE W/DOPPLER COMPLETE: CPT

## 2022-10-14 PROCEDURE — 74011250636 HC RX REV CODE- 250/636: Performed by: ANESTHESIOLOGY

## 2022-10-14 RX ORDER — HYDROCODONE BITARTRATE AND ACETAMINOPHEN 5; 325 MG/1; MG/1
1 TABLET ORAL
Qty: 20 TABLET | Refills: 0 | Status: SHIPPED | OUTPATIENT
Start: 2022-10-14 | End: 2022-10-21

## 2022-10-14 RX ORDER — ESCITALOPRAM OXALATE 10 MG/1
5 TABLET ORAL DAILY
Status: DISCONTINUED | OUTPATIENT
Start: 2022-10-15 | End: 2022-10-15 | Stop reason: HOSPADM

## 2022-10-14 RX ORDER — PROCHLORPERAZINE EDISYLATE 5 MG/ML
10 INJECTION INTRAMUSCULAR; INTRAVENOUS
Status: DISCONTINUED | OUTPATIENT
Start: 2022-10-14 | End: 2022-10-15 | Stop reason: HOSPADM

## 2022-10-14 RX ORDER — IBUPROFEN 600 MG/1
600 TABLET ORAL
Qty: 30 TABLET | Refills: 0 | Status: SHIPPED | OUTPATIENT
Start: 2022-10-14

## 2022-10-14 RX ADMIN — DOCUSATE SODIUM 100 MG: 100 CAPSULE, LIQUID FILLED ORAL at 08:52

## 2022-10-14 RX ADMIN — IBUPROFEN 800 MG: 800 TABLET, FILM COATED ORAL at 13:02

## 2022-10-14 RX ADMIN — KETOROLAC TROMETHAMINE 30 MG: 30 INJECTION, SOLUTION INTRAMUSCULAR at 01:11

## 2022-10-14 RX ADMIN — ONDANSETRON 4 MG: 2 INJECTION INTRAMUSCULAR; INTRAVENOUS at 01:11

## 2022-10-14 RX ADMIN — SIMETHICONE 80 MG: 80 TABLET, CHEWABLE ORAL at 08:52

## 2022-10-14 RX ADMIN — IBUPROFEN 800 MG: 800 TABLET, FILM COATED ORAL at 21:00

## 2022-10-14 RX ADMIN — KETOROLAC TROMETHAMINE 30 MG: 30 INJECTION, SOLUTION INTRAMUSCULAR at 07:19

## 2022-10-14 RX ADMIN — SODIUM CHLORIDE, POTASSIUM CHLORIDE, SODIUM LACTATE AND CALCIUM CHLORIDE 125 ML/HR: 600; 310; 30; 20 INJECTION, SOLUTION INTRAVENOUS at 07:20

## 2022-10-14 RX ADMIN — OXYCODONE HYDROCHLORIDE 10 MG: 5 TABLET ORAL at 15:16

## 2022-10-14 RX ADMIN — HYDROMORPHONE HYDROCHLORIDE 0.5 MG: 1 INJECTION, SOLUTION INTRAMUSCULAR; INTRAVENOUS; SUBCUTANEOUS at 03:17

## 2022-10-14 RX ADMIN — OXYCODONE HYDROCHLORIDE 5 MG: 5 TABLET ORAL at 08:54

## 2022-10-14 RX ADMIN — SIMETHICONE 80 MG: 80 TABLET, CHEWABLE ORAL at 18:54

## 2022-10-14 RX ADMIN — SIMETHICONE 80 MG: 80 TABLET, CHEWABLE ORAL at 13:02

## 2022-10-14 RX ADMIN — HYDROCODONE BITARTRATE AND ACETAMINOPHEN 2 TABLET: 5; 325 TABLET ORAL at 21:00

## 2022-10-14 RX ADMIN — DOCUSATE SODIUM 100 MG: 100 CAPSULE, LIQUID FILLED ORAL at 18:54

## 2022-10-14 RX ADMIN — PROCHLORPERAZINE EDISYLATE 10 MG: 5 INJECTION INTRAMUSCULAR; INTRAVENOUS at 03:36

## 2022-10-14 NOTE — PROGRESS NOTES
0530: Patient feeling nausea and vomiting. Knox remains in place d/t low urine output. Will continue to assess.

## 2022-10-14 NOTE — ROUTINE PROCESS
SBAR IN Report: Mother    Verbal report received from RADHA Hawkins RN (full name & credentials) on this patient, who is now being transferred from L&D (unit) for routine progression of care. Report consisted of patient's Situation, Background, Assessment and Recommendations (SBAR).  ID bands were compared with the identification form, and verified with the patient and transferring nurse. Information from the SBAR, Kardex, Intake/Output, MAR, and Recent Results and the [place] Report was reviewed with the transferring nurse; opportunity for questions and clarification provided.

## 2022-10-14 NOTE — L&D DELIVERY NOTE
Delivery Summary    Patient: Teresa Ferguson MRN: [id number]  SSN: [id number]    YOB: 1990  Age: 28 y.o. Sex: female        Information for the patient's :  Claudell Reddish [[id number]]     Labor Events:    Labor: No    Steroids: None   Cervical Ripening Date/Time:       Cervical Ripening Type: None   Antibiotics During Labor: Yes   Rupture Identifier: Sac 1    Rupture Date/Time: 10/13/2022 5:35 PM   Rupture Type: AROM   Amniotic Fluid Volume: Large    Amniotic Fluid Description: Clear    Amniotic Fluid Odor: None    Induction:         Induction Date/Time:        Indications for Induction:      Augmentation: None   Augmentation Date/Time:      Indications for Augmentation:     Labor complications: None       Additional complications:        Delivery Events:  Indications For Episiotomy:     Episiotomy: None   Perineal Laceration(s): None   Repaired:     Periurethral Laceration Location:      Repaired:     Labial Laceration Location:     Repaired:     Sulcal Laceration Location:     Repaired:     Vaginal Laceration Location:     Repaired:     Cervical Laceration Location:     Repaired:     Repair Suture: None   Number of Repair Packets:     Estimated Blood Loss (ml):  ml   Quantitaive Blood Loss (ml):             Delivery Date: 10/13/2022    Delivery Time: 5:35 PM   Delivery Type: , Low Transverse     Details    Trial of Labor: No   Primary/Repeat: Repeat   Priority: Routine   Indications:  Prior Uterine Surgery       Sex:  Female     Gestational Age: 37w6d  Delivery Clinician:  Teddy Merino  Living Status: Living   Delivery Location: L&D  OR OR 2          APGARS  One minute Five minutes Ten minutes   Skin color: 1   1        Heart rate: 2   2        Grimace: 2   2        Muscle tone: 2   2        Breathin   2        Totals: 9   9          Presentation: Vertex    Position:        Resuscitation Method:  Suctioning-bulb; Tactile Stimulation Meconium Stained: None      Cord Information: 3 Vessels  Complications: None  Cord around:    Delayed cord clamping? Yes  Cord clamped date/time:10/13/2022  5:36 PM  Disposition of Cord Blood: Lab    Blood Gases Sent?: No    Placenta:  Date/Time: 10/13/2022  5:37 PM  Removal: Expressed      Appearance: Normal     [place] Measurements:  Birth Weight: 6 lb 7.2 oz (2.925 kg)      Birth Length: 1' 6.75\" (0.476 m)      Head Circumference: 1' 0.99\" (0.33 m)      Chest Circumference: 1' 0.99\" (0.33 m)     Abdominal Girth: 1' 0.4\" (0.315 m)    Other Providers:   RADHA BUCK;MAICOL YEE;CARLITO MORENO;KATHERINE ALBRIGHT;Joey PARIKH ALLISON;Chica TOMPKINS, Obstetrician;Primary Nurse;Primary [place] Nurse; Anesthesiologist;Crna; Charge Nurse;Scrub Tech;Surgeon Assistant           Group B Strep:   Lab Results   Component Value Date/Time    GrBStrep, External positive 2012 12:00 AM     Information for the patient's :  Bette Condon [[id number]]     Lab Results   Component Value Date/Time    ABO/Rh(D) A POSITIVE 10/13/2022 06:49 PM    ROSA ISELA IgG NEG 10/13/2022 06:49 PM    Bilirubin if ROSA ISELA pos: IF DIRECT SARAHY POSITIVE, BILIRUBIN TO FOLLOW 10/13/2022 06:49 PM      No results for input(s): PCO2CB, PO2CB, HCO3I, SO2I, IBD, PTEMPI, SPECTI, PHICB, ISITE, IDEV, IALLEN in the last 72 hours.

## 2022-10-14 NOTE — PROGRESS NOTES
Echo with normal findings    No further cardiac testing needed    Please call if any further problems. Can see Cardiology as needed.

## 2022-10-14 NOTE — ROUTINE PROCESS
Bedside and Verbal shift change report given to DANIELLE Lugo (oncoming nurse) by PIOTR Ragsdale RN (offgoing nurse). Report included the following information SBAR, Kardex, Intake/Output, MAR, and Recent Results.

## 2022-10-14 NOTE — DISCHARGE SUMMARY
Obstetrical Discharge Summary     Name: Darrin Jovel MRN: [id number]  SSN: [id number]    YOB: 1990  Age: 28 y.o. Sex: female      Admit Date: 10/13/2022    Discharge Date: 10/15/2022 11:21 AM    Admitting Physician: Ron Blum MD     Attending Physician:  Jacinta att. providers found     Admission Diagnoses: Elevated blood pressure affecting pregnancy in third trimester, antepartum [O16.3]; Pregnancy [Z34.90]    Condition on Discharge: Stable    Procedures: repeat LTCS    Disposition: to home    Follow up: Follow-up Appointments   Procedures    FOLLOW UP VISIT Appointment in: 6 Weeks Optional incision check 2 weeks     Optional incision check 2 weeks     Standing Status:   Standing     Number of Occurrences:   1     Order Specific Question:   Appointment in     Answer:   6 Weeks        Discharge Diagnoses:   Information for the patient's :  Lidya Davidson [[id number]]   Delivery of a 6 lb 7.2 oz (2.925 kg) female infant via , Low Transverse on 10/13/2022 at 5:35 PM  by Suha Soriano. Apgars were 9  and 9 . Additional Diagnoses:   Hospital Problems  Date Reviewed: 10/13/2022            Codes Class Noted POA    Pregnancy ICD-10-CM: Z34.90  ICD-9-CM: V22.2  10/14/2022 Unknown        Elevated blood pressure affecting pregnancy in third trimester, antepartum ICD-10-CM: O16.3  ICD-9-CM: 642.33  10/13/2022 Unknown          Lab Results   Component Value Date/Time    Rubella, External Immune 2022 12:00 AM    GrBStrep, External positive 2012 12:00 AM       Hospital Course: Normal hospital course following the delivery. Her diet was advanced postoperative and she was tolerating general diet on the day of discharge. Her gracia was discontinued and she was able to void spontaneously. Her pain was controlled with oral pain medications once she was able to tolerate oral intake.       Patient Instructions:   Current Discharge Medication List        START taking these medications    Details   ibuprofen (MOTRIN) 600 mg tablet Take 1 Tablet by mouth every six (6) hours as needed for Pain. Take with food. Qty: 30 Tablet, Refills: 0      HYDROcodone-acetaminophen (NORCO) 5-325 mg per tablet Take 1 Tablet by mouth every four (4) hours as needed for Pain for up to 7 days. Max Daily Amount: 6 Tablets. Qty: 20 Tablet, Refills: 0    Associated Diagnoses: H/O: ; Gestational hypertension, antepartum           CONTINUE these medications which have NOT CHANGED    Details   Ferrous Fumarate 325 mg (106 mg iron) tab Take 1 Tablet by mouth. BABY ASPIRIN PO Take  by mouth.      magnesium 250 mg tab Take  by mouth. acetaminophen (TYLENOL) 325 mg tablet Take  by mouth every four (4) hours as needed for Pain. calcium carbonate (TUMS) 200 mg calcium (500 mg) chew Take 1 Tablet by mouth daily. !! promethazine (PHENERGAN) 12.5 mg tablet Take 1 Tablet by mouth every six (6) hours as needed for Nausea (patient may take 1/2 dose if causing drowsiness. ). Qty: 30 Tablet, Refills: 0      escitalopram oxalate (LEXAPRO) 10 mg tablet Take 5 mg by mouth every other day. prenatal vit-iron fumarate-fa 27 mg iron- 0.8 mg tab tablet Take 1 Tablet by mouth daily. !! promethazine (PHENERGAN) 12.5 mg tablet Take 1 Tablet by mouth every six (6) hours as needed for Nausea for up to 30 days. Qty: 30 Tablet, Refills: 2       !! - Potential duplicate medications found. Please discuss with provider. Reference my discharge instructions.       Signed By:  Bailee Hawkins MD     2022

## 2022-10-14 NOTE — PROGRESS NOTES
1900: Shift report received from Tu 1: Pt reporting 10/10 pain. Pt given IV toradol at this time. Pt reports nausea and has been vomiting. Pt refusing anymore nausea medicine at this time. 2105: Pt transferred to MIU for postpartum care.  Bedside report given to [facility]

## 2022-10-14 NOTE — DISCHARGE INSTRUCTIONS
[facility] OB-GYN  [email or web address]  [address] MD Nicola, FACOG     POST DELIVERY DISCHARGE INSTRUCTIONS  FROM YOUR PHYSICIAN    Name: Ravi Villaseñor  YOB: 1990    General:     Read all discharge information provided by the hospital    Anemia:  Your results show some anemia, or low blood count. You should start, or add, an over the counter elemental iron supplement of 45-65 mg. Consider 'Slow FE' or ferrous sulfate 325 mg once a day for at least three months. Also take vitamin C 250 mg and a daily prenatal vitamin to help anemia. Add a stool softener, like docusate or colace 100 mg (2x/day) to avoid constipation. If you do not tolerate supplements you can try blackstrap molasses (1 tbsp=27mg elemental iron). Diet/Diet Restrictions:  Eat healthy meals and snacks as desired. Eat foods that are high in fiber and low in fat and cholesterol. Drink eight 8-ounce glasses of water daily; avoid excessive caffeine intake. http://www.aga-coker.org/. html  EliteClients.be    Medications:   See discharge medication list and read instructions carefully. Breast Feeding:  See instructions from your lactation consult. Call [phone] for more information or to locate a lactation consultant. https://www.brownlee.info/    Vaccines:  If you received the MMR vaccine postpartum you should wait three months until you get pregnant again. You, and close contacts, should make sure that the Tdap vaccine is up to date. This vaccine can decrease the risk of your baby getting pertussis or \"whooping cough. \"    You, and close contacts, should receive the influenza vaccine during flu season when appropriate. SalaryStart.tn    Tobacco Use:   If you (or other people around the baby) smoke or use tobacco products, please try to  use and quit to improve your health and decrease risk to your baby. LimitBuy.nl. htm    Swelling in your Legs:  There are many fluid changes after delivery and you may have more swelling the first few days after delivery  Continue to drink plenty of water, avoid sitting or standing in one position for too long and elevate your feet above your heart, to help reduce some the pressure you may be feeling in your ankles and legs.  Section Incision:  Steri-strips or tape strips may be removed gently at home approximately 7- 10 days after surgery. Soaking the strips with a warm, wet cloth or taking a shower may make the strips easier to remove. Metal staples are usually removed within 3 to 10 days, either before you leave the hospital or in the office. Make an appointment if needed. Insorb absorbable staples may be used under the skin but you may see small white pieces as they dissolve. Skin glue or dermabond will fall off with time. Abdominal incisions should be kept clean by showering. It is not necessary to put soap on the incision; plain tap water is adequate. Avoid scrubbing the area and pat dry. The way your scar looks will change over time and may not reach its final appearance for up to a year. The area may feel either numb or sensitive to touch, which is normal.         Physical Activity / Restrictions / Safety:     Avoid heavy lifting, no more that 10 pounds, for 2-3 weeks. No driving while taking narcotic pain medication, of if you can not slam on the brakes. No intercourse for 4-6 weeks, no douching or tampon use until seen by your doctor for your postpartum visit. Use condoms as needed for contraception with sexual activity. You may resume normal exercise after you are cleared by your physician at your postpartum check. You may walk for exercise, as tolerated.      Discharge Instructions/Special Treatment/Home Care Needs:     Continue your prenatal vitamins while breast feeding or pumping. Continue to use a squirt bottle with warm water on your perineum/bottom/episiotomy after each bathroom use until bleeding stops. Take stool softeners daily. For example, docusate over the counter stool softener. This is especially important if you are taking narcotic pain medications, because they can cause constipation. Call your doctor for the following: If you have a fever over 100.4 degrees by mouth on two readings. If you have persistent vaginal bleeding heavier than a heavy menstrual period or persistent large clots or if you are bleeding so heavy it is making you feel weak. It is normal to pass larger clots when you first get out of bed: but if they persist, notify your physician. If you have red streaks or increased swelling of legs, painful red streaks on your breast.  If you have painful urination, or increased pain, redness or discharge with your incision. If you have any questions or concerns. Pain Management:     Take Acetaminophen (Tylenol), Ibuprofen (Advil, Motrin), prescribed pain medications as directed for pain. Do not take Perocet with Tylenol, they both contain acetaminophen. Use a warm water Sitz bath 3 times daily to relieve episiotomy, bottom/perineum or hemorrhoidal discomfort. Apply heating pad to  incision as needed. For hemorrhoidal discomfort, you can use Tucks and Anusol cream as needed and directed.     NSAID information for patients:  Lucila    Pain medication/narcotic information for patients:   Take your medicine exactly as prescribed   Store your medicine away from children and in a safe  place   Do not give your medicine to others   Do not drink alcohol while taking this medicine    Follow-Up Care: Appointment with MD:  Dr. Miles Soliman    Schedule your postpartum visit for six weeks        Additional Discharge Instructions    Please read all of your discharge instructions  Follow all of your medication instructions carefully  Call our office on the next business day to schedule your follow-up appointment  If you have any questions or concerns, please contact us at [phone] or if the situation is urgent contact [phone]  Become a [employer] My Chart user so you can access information, results and appointments: go to [email or web address]. You may receive a survey about your delivery. Please take a minute to give us your feedback, and we hope that you were fully satisfied with your care. If you did not receive excellent communication, compassionate care and an outstanding patient experience, please notify Cassandra Garcia, the practice manger, or myself at [phone] or discuss your concerns with me at your next visit so that we can always meet our mission and your expectations. Thank you.   Dr. Allison Bennett MD  [address] [place] [address], [address]  [email or web address]   [phone]   Good Help to Those in [facility]

## 2022-10-14 NOTE — PROGRESS NOTES
Post-Operative  Progress Note      Information for the patient's :  Ameena Saab [[id number]]   , Low Transverse    Patient doing well without significant complaint. Nausea and vomiting resolved. She is tolerating oral intake. Pain well controlled. Nausea, +improving. Pain improving. Delivery information:  Information for the patient's :  Ameena Saab [[id number]]   Delivery of a 6 lb 7.2 oz (2.925 kg) female infant via , Low Transverse on 10/13/2022 at 5:35 PM  by Amber Nagy. Apgars were 9  and 9 .      Vitals:  Patient Vitals for the past 12 hrs:   Temp Pulse Resp BP SpO2   10/14/22 0810 -- -- -- 117/74 --   10/14/22 0741 97.4 °F (36.3 °C) 76 16 117/74 99 %   10/14/22 0354 97.6 °F (36.4 °C) 75 16 136/84 96 %   10/13/22 2244 97.6 °F (36.4 °C) 75 16 122/74 98 %   10/13/22 2100 97.8 °F (36.6 °C) 74 16 139/78 99 %   10/13/22 2031 -- -- -- -- 100 %   10/13/22 2029 -- 75 16 (!) 149/86 100 %   10/13/22 2026 -- -- -- -- 100 %       Temp (24hrs), Av.9 °F (36.6 °C), Min:97.4 °F (36.3 °C), Max:98.7 °F (37.1 °C)      Last 24hr Input/Output:    Intake/Output Summary (Last 24 hours) at 10/14/2022 7461  Last data filed at 10/14/2022 8771  Gross per 24 hour   Intake 1020 ml   Output 1720 ml   Net -700 ml        Exam:    Gen: Patient without distress  Lungs: clear to ascultation bilaterally  Cor: S1, S2, regular rate and rhythm  Abdomen: bowel sounds present, soft, appropriate tenderness, fundus firm   Incision: clean, dry and intact, small amount of serosanguinous drainage on dressing on right upper side  Lower extremities: negative for cords or tenderness, trace edema bilaterally    Labs:   Recent Results (from the past 24 hour(s))   EKG, 12 LEAD, INITIAL    Collection Time: 10/13/22 11:41 AM   Result Value Ref Range    Ventricular Rate 85 BPM    Atrial Rate 85 BPM    P-R Interval 130 ms    QRS Duration 70 ms    Q-T Interval 352 ms    QTC Calculation (Bezet) 418 ms    Calculated P Axis 43 degrees    Calculated R Axis 45 degrees    Calculated T Axis 6 degrees    Diagnosis       Normal sinus rhythm  Normal ECG  When compared with ECG of 19-SEP-2013 11:10,  No significant change was found     METABOLIC PANEL, COMPREHENSIVE    Collection Time: 10/13/22 12:29 PM   Result Value Ref Range    Sodium 139 136 - 145 mmol/L    Potassium 4.1 3.5 - 5.1 mmol/L    Chloride 108 97 - 108 mmol/L    CO2 23 21 - 32 mmol/L    Anion gap 8 5 - 15 mmol/L    Glucose 79 65 - 100 mg/dL    BUN 12 6 - 20 MG/DL    Creatinine 0.44 (L) 0.55 - 1.02 MG/DL    BUN/Creatinine ratio 27 (H) 12 - 20      eGFR >60 >60 ml/min/1.73m2    Calcium 9.0 8.5 - 10.1 MG/DL    Bilirubin, total 0.2 0.2 - 1.0 MG/DL    ALT (SGPT) 16 12 - 78 U/L    AST (SGOT) 15 15 - 37 U/L    Alk. phosphatase 124 (H) 45 - 117 U/L    Protein, total 6.0 (L) 6.4 - 8.2 g/dL    Albumin 2.8 (L) 3.5 - 5.0 g/dL    Globulin 3.2 2.0 - 4.0 g/dL    A-G Ratio 0.9 (L) 1.1 - 2.2     CBC WITH AUTOMATED DIFF    Collection Time: 10/13/22 12:29 PM   Result Value Ref Range    WBC 7.4 3.6 - 11.0 K/uL    RBC 3.43 (L) 3.80 - 5.20 M/uL    HGB 10.9 (L) 11.5 - 16.0 g/dL    HCT 32.7 (L) 35.0 - 47.0 %    MCV 95.3 80.0 - 99.0 FL    MCH 31.8 26.0 - 34.0 PG    MCHC 33.3 30.0 - 36.5 g/dL    RDW 13.0 11.5 - 14.5 %    PLATELET 315 506 - 829 K/uL    MPV 10.3 8.9 - 12.9 FL    NRBC 0.0 0  WBC    ABSOLUTE NRBC 0.00 0.00 - 0.01 K/uL    NEUTROPHILS 64 32 - 75 %    LYMPHOCYTES 25 12 - 49 %    MONOCYTES 7 5 - 13 %    EOSINOPHILS 1 0 - 7 %    BASOPHILS 1 0 - 1 %    IMMATURE GRANULOCYTES 2 (H) 0.0 - 0.5 %    ABS. NEUTROPHILS 4.8 1.8 - 8.0 K/UL    ABS. LYMPHOCYTES 1.8 0.8 - 3.5 K/UL    ABS. MONOCYTES 0.5 0.0 - 1.0 K/UL    ABS. EOSINOPHILS 0.1 0.0 - 0.4 K/UL    ABS. BASOPHILS 0.1 0.0 - 0.1 K/UL    ABS. IMM.  GRANS. 0.1 (H) 0.00 - 0.04 K/UL    DF AUTOMATED     PROTEIN/CREATININE RATIO, URINE    Collection Time: 10/13/22 12:29 PM   Result Value Ref Range    Protein, urine random 23 (H) 0.0 - 11.9 mg/dL    Creatinine, urine random 93.00 mg/dL    Protein/Creat. urine Ratio 0.2     TYPE & SCREEN    Collection Time: 10/13/22 12:29 PM   Result Value Ref Range    Crossmatch Expiration 10/16/2022,2359     ABO/Rh(D) Pat Albrecht POSITIVE     Antibody screen NEG    TROPONIN-HIGH SENSITIVITY    Collection Time: 10/13/22  2:18 PM   Result Value Ref Range    Troponin-High Sensitivity 6 0 - 51 ng/L   CBC WITH AUTOMATED DIFF    Collection Time: 10/14/22  4:01 AM   Result Value Ref Range    WBC 8.5 3.6 - 11.0 K/uL    RBC 2.92 (L) 3.80 - 5.20 M/uL    HGB 9.3 (L) 11.5 - 16.0 g/dL    HCT 27.7 (L) 35.0 - 47.0 %    MCV 94.9 80.0 - 99.0 FL    MCH 31.8 26.0 - 34.0 PG    MCHC 33.6 30.0 - 36.5 g/dL    RDW 12.9 11.5 - 14.5 %    PLATELET 263 303 - 530 K/uL    MPV 10.4 8.9 - 12.9 FL    NRBC 0.0 0  WBC    ABSOLUTE NRBC 0.00 0.00 - 0.01 K/uL    NEUTROPHILS 73 32 - 75 %    LYMPHOCYTES 18 12 - 49 %    MONOCYTES 6 5 - 13 %    EOSINOPHILS 1 0 - 7 %    BASOPHILS 1 0 - 1 %    IMMATURE GRANULOCYTES 1 (H) 0.0 - 0.5 %    ABS. NEUTROPHILS 6.3 1.8 - 8.0 K/UL    ABS. LYMPHOCYTES 1.5 0.8 - 3.5 K/UL    ABS. MONOCYTES 0.5 0.0 - 1.0 K/UL    ABS. EOSINOPHILS 0.1 0.0 - 0.4 K/UL    ABS. BASOPHILS 0.0 0.0 - 0.1 K/UL    ABS. IMM.  GRANS. 0.1 (H) 0.00 - 0.04 K/UL    DF AUTOMATED     ECHO ADULT COMPLETE    Collection Time: 10/14/22  8:11 AM   Result Value Ref Range    IVSd 0.9 0.6 - 0.9 cm    LVIDd 4.7 3.9 - 5.3 cm    LVIDs 3.9 cm    LVOT Diameter 2.0 cm    LVPWd 1.0 (A) 0.6 - 0.9 cm    EF BP 55 55 - 100 %    LV Ejection Fraction A2C 56 %    LV Ejection Fraction A4C 53 %    LV EDV A2C 130 mL    LV EDV A4C 96 mL    LV EDV  (A) 56 - 104 mL    LV ESV A2C 57 mL    LV ESV A4C 45 mL    LV ESV BP 51 (A) 19 - 49 mL    LVOT Peak Gradient 3 mmHg    LVOT Mean Gradient 2 mmHg    LVOT SV 59.0 ml    LVOT Peak Velocity 0.9 m/s    LVOT VTI 18.8 cm    RVIDd 4.2 cm    RV Free Wall Peak S' 10 cm/s    LA Diameter 2.7 cm    LA Volume A/L 48 mL    LA Volume 2C 46 22 - 52 mL    LA Volume 4C 36 22 - 52 mL    LA Volume BP 42 22 - 52 mL    AV Area by Peak Velocity 2.2 cm2    AV Peak Gradient 7 mmHg    AV Peak Velocity 1.4 m/s    MV A Velocity 0.57 m/s    MV E Wave Deceleration Time 99.4 ms    MV E Velocity 0.77 m/s    LV E' Lateral Velocity 14 cm/s    LV E' Septal Velocity 12 cm/s    Pulmonary Artery EDP 5 mmHg    WY Max Velocity 1.2 m/s    PV Peak Gradient 4 mmHg    PV Max Velocity 1.0 m/s    TAPSE 2.3 1.7 cm    TR Peak Gradient 25 mmHg    TR Max Velocity 2.47 m/s    Aortic Arch 1.6 cm    Ascending Aorta 2.6 cm    Fractional Shortening 2D 17 28 - 44 %    LV ESV Index BP 27 mL/m2    LV EDV Index BP 59 mL/m2    LV ESV Index A4C 24 mL/m2    LV EDV Index A4C 50 mL/m2    LV ESV Index A2C 30 mL/m2    LV EDV Index A2C 68 mL/m2    LVIDd Index 2.46 cm/m2    LVIDs Index 2.04 cm/m2    LV RWT Ratio 0.43     LV Mass 2D 153.4 67 - 162 g    LV Mass 2D Index 80.3 43 - 95 g/m2    MV E/A 1.35     E/E' Ratio (Averaged) 5.96     E/E' Lateral 5.50     E/E' Septal 6.42     LA Volume Index BP 22 16 - 34 ml/m2    LA Volume Index A/L 25 16 - 34 mL/m2    LVOT Stroke Volume Index 30.9 mL/m2    LVOT Area 3.1 cm2    LA Volume Index 2C 24 16 - 34 mL/m2    LA Volume Index 4C 19 16 - 34 mL/m2    LA Size Index 1.41 cm/m2    Ascending Aorta Index 1.36 cm/m2    AV Velocity Ratio 0.64     CHRISTY/BSA Peak Velocity 1.2 cm2/m2       Lab Results   Component Value Date/Time    Hemoglobin (POC) 12.2 2014 07:13 PM    HGB 9.3 (L) 10/14/2022 04:01 AM    Hgb, External 12.7 10/26/2011 12:00 AM       Assessment:   Post-Op , stable  POP 1  GHTN vs CHTN, stable BP  Plan:     1. Routine post-operative care  2. Encouraged out of bed and ambulation  3. Oral pain medications  4. Advance diet  5. Continue postpartum and  teaching by nursing  6.  Advance diet as tolerated    Monica Priest MD

## 2022-10-15 VITALS
BODY MASS INDEX: 27.47 KG/M2 | WEIGHT: 175.04 LBS | DIASTOLIC BLOOD PRESSURE: 74 MMHG | SYSTOLIC BLOOD PRESSURE: 116 MMHG | OXYGEN SATURATION: 97 % | RESPIRATION RATE: 16 BRPM | TEMPERATURE: 98 F | HEIGHT: 67 IN | HEART RATE: 73 BPM

## 2022-10-15 LAB
ATRIAL RATE: 85 BPM
CALCULATED P AXIS, ECG09: 43 DEGREES
CALCULATED R AXIS, ECG10: 45 DEGREES
CALCULATED T AXIS, ECG11: 6 DEGREES
DIAGNOSIS, 93000: NORMAL
P-R INTERVAL, ECG05: 130 MS
Q-T INTERVAL, ECG07: 352 MS
QRS DURATION, ECG06: 70 MS
QTC CALCULATION (BEZET), ECG08: 418 MS
VENTRICULAR RATE, ECG03: 85 BPM

## 2022-10-15 PROCEDURE — 2709999900 HC NON-CHARGEABLE SUPPLY

## 2022-10-15 PROCEDURE — 74011250637 HC RX REV CODE- 250/637: Performed by: OBSTETRICS & GYNECOLOGY

## 2022-10-15 RX ADMIN — DOCUSATE SODIUM 100 MG: 100 CAPSULE, LIQUID FILLED ORAL at 08:59

## 2022-10-15 RX ADMIN — ESCITALOPRAM OXALATE 5 MG: 10 TABLET ORAL at 09:00

## 2022-10-15 RX ADMIN — HYDROCODONE BITARTRATE AND ACETAMINOPHEN 2 TABLET: 5; 325 TABLET ORAL at 06:11

## 2022-10-15 RX ADMIN — SIMETHICONE 80 MG: 80 TABLET, CHEWABLE ORAL at 06:11

## 2022-10-15 RX ADMIN — HYDROCODONE BITARTRATE AND ACETAMINOPHEN 2 TABLET: 5; 325 TABLET ORAL at 00:57

## 2022-10-15 NOTE — PROGRESS NOTES
193 Received EPDS - scored 14 initially with answer to last question as \"hardly ever. \" Spoke with patient and asked if she ever felt like harming herself in the last 7 days and she responded that she felt like this more than 7 days ago \"within the last few weeks. \" Patient stated she was counseled by her PCP and OB to wean off Lexapro prior to delivery to minimize withdrawal symptoms of the . EPDS form updated to reflect conversation with patient - changed last question to \"never. \"    Patient stopped taking Lexapro 5mg three weeks ago and has not been feeling well since. She has only ever taken Lexapro and it has worked for her well in the past. Patient stated she felt depressed after her grandmother's passing in 2020. Patient desires to be put back on Lexapro 5mg.     2300 Dr. Diaz Nurse (OB on call) TORB of Lexapro 5mg daily 0900.    0700 Bedside and verbal shift change report given to oncoming nurse, as assigned, by offgoing nurse, Ranjit Cho RN. Report included SBAR, Kardex, I&Os, Recent Results, Procedures, MAR, and changes in patient status. Oncoming nurse and patient given opportunity for questions.

## 2022-10-15 NOTE — LACTATION NOTE
This note was copied from a baby's chart. Mother and baby for early discharge. Mother had been formula feeding her baby but decided to try and breastfeed baby. Mother states she has ordered a breast pump for home use. Reviewed pumping/storage and preparation of expressed breast milk for baby. LC discussed the following:    Discussed with mother her plan for feeding. Reviewed the benefits of exclusive breast milk feeding during the hospital stay. Informed her of the risks of using formula to supplement in the first few days of life as well as the benefits of successful breast milk feeding; referred her to the Breastfeeding booklet about this information. She acknowledges understanding of information reviewed and states that it is her plan to breast/formula feed her infant. Will support her choice and offer additional information as needed. Mother will successfully establish breastfeeding by feeding in response to early feeding cues   or wake every 3h, will obtain deep latch, and will keep log of feedings/output. Taught to BF at hunger cues and or q 2-3 hrs and to offer 10-20 drops of hand expressed colostrum at any non-feeds. Breast Assessment  Left Breast: Medium  Left Nipple: Everted, Intact  Right Breast: Medium  Right Nipple: Everted, Intact  Breast- Feeding Assessment  Attends Breast-Feeding Classes: No  Breast-Feeding Experience: No (Mother states she tried to breast feed her older child for a few days but had latch problems and stopped.)  Breast Trauma/Surgery: No  Type/Quality: Good (Mother had been formula feeding and decided to now try and breastfeed her baby. Mother and baby for early discharge.)  Lactation Consultant Visits  Breast-Feedings: Good  (Baby breast fd on right breast at 0900 for 25 minutes.  Mother then put baby to left breast and baby suckled for 5 minutes then fell asleep.)  Mother/Infant Observation  Mother Observation: Alignment, Holds breast, Breast comfortable, Lets baby end feeding, Nipple round on release, Close hold, Recognizes feeding cues  Infant Observation: Audible swallows, Lips flanged, upper, Opens mouth, Rhythmic suck, Latches nipple and aereolae, Lips flanged, lower, Relaxed after feeding  LATCH Documentation  Latch: Grasps breast, tongue down, lips flanged, rhythmic sucking  Audible Swallowing: A few with stimulation  Type of Nipple: Everted (after stimulation)  Comfort (Breast/Nipple): Soft/non-tender  Hold (Positioning): No assist from staff, mother able to position/hold infant  LATCH Score: 9     Discussed eating a healthy diet. Instructed mother to eat a variety of foods in order to get a well balanced diet. She should consume an extra 500 calories per day (more than her non-pregnant requirement.) These extra calories will help provide energy needed for optimal breast milk production. Mother also encouraged to \"drink to thirst\" and it is recommended that she drink fluids such as water, fruit/vegetable juice. Nutritious snacks should be available so that she can eat throughout the day to help satisfy her hunger and maintain a good milk supply. Discussed what to do if she gets engorged or nipples become sore:  Engorgement Care Guidelines:  Reviewed how milk is made and normal phases of milk production. Taught care of engorged breasts - frequent breastfeeding encouraged, warm compress before feedings and cool packs after feedings as tolerated. Anticipatory guidance shared. Care for sore/tender nipples discussed:  ways to improve positioning and latch practiced and discussed, hand express colostrum after feedings and let air dry, light application of lanolin, seek comfortable laid back feeding position, start feedings on least sore side first.     Chart shows numerous feedings, void, stool WNL. Discussed importance of monitoring outputs and feedings on first week of life.   Discussed ways to tell if baby is  getting enough breast milk, ie  voids and stools, change in color of stool, and return to birth wt within 2 weeks. Follow up with pediatrician visit for weight check in 1-2 days (per AAP guidelines.)  Encouraged to call Warm Line  [phone]  for any questions/problems that arise.  Mother also given breastfeeding support group dates and times for any future needs

## 2022-10-15 NOTE — PROGRESS NOTES
Patient discharged to home. Education completed. Patient reported she had no more questions. Bands verified on patient and infant, see footprint sheet. Infant placed in carseat by parent.   Prescriptions: norco and motrin

## 2022-10-15 NOTE — PROGRESS NOTES
Post-Operative Day Number 2 Progress Note    Patient doing well post-op day 2 from  delivery without significant complaints. Pain controlled on current medication. Voiding without difficulty, normal lochia. Vitals:  Patient Vitals for the past 8 hrs:   BP Temp Pulse Resp SpO2   10/15/22 0724 116/74 98 °F (36.7 °C) 73 16 97 %   10/15/22 0307 138/82 98.3 °F (36.8 °C) 80 16 99 %     Temp (24hrs), Av.2 °F (36.8 °C), Min:97.5 °F (36.4 °C), Max:99.3 °F (37.4 °C)      Vital signs stable, afebrile. Exam:  Patient without distress. Abdomen soft, fundus firm at level of umbilicus, nontender. Incision dry and clean without erythema. Lower extremities are negative for swelling, cords or tenderness. Labs: No results found for this or any previous visit (from the past 24 hour(s)). Assessment and Plan:  Patient appears to be having uncomplicated post- course. Continue routine post-op care and maternal education. DC home today  Has Lexapro at home to take.  Disc increasing dose to 10mg in 5 days if she needs to   Has fu with Dr. Stormy Grimes

## 2022-11-27 ENCOUNTER — PATIENT MESSAGE (OUTPATIENT)
Dept: OBGYN CLINIC | Age: 32
End: 2022-11-27

## 2022-12-01 ENCOUNTER — OFFICE VISIT (OUTPATIENT)
Dept: OBGYN CLINIC | Age: 32
End: 2022-12-01
Payer: MEDICAID

## 2022-12-01 VITALS
BODY MASS INDEX: 23.54 KG/M2 | HEIGHT: 67 IN | HEART RATE: 78 BPM | WEIGHT: 150 LBS | SYSTOLIC BLOOD PRESSURE: 141 MMHG | DIASTOLIC BLOOD PRESSURE: 89 MMHG

## 2022-12-01 DIAGNOSIS — R03.0 ELEVATED BLOOD PRESSURE READING: ICD-10-CM

## 2022-12-01 PROCEDURE — 0503F POSTPARTUM CARE VISIT: CPT | Performed by: OBSTETRICS & GYNECOLOGY

## 2022-12-01 RX ORDER — MEDROXYPROGESTERONE ACETATE 150 MG/ML
150 INJECTION, SUSPENSION INTRAMUSCULAR ONCE
Qty: 1 ML | Refills: 1 | Status: SHIPPED | OUTPATIENT
Start: 2022-12-01 | End: 2022-12-01

## 2022-12-01 NOTE — PROGRESS NOTES
Sung Ayala MD, FACOG     Postpartum evaluation    Iesha Alvarado is a 28 y.o. G3  who presents for a postpartum exam.     OB History    Para Term  AB Living   3 2 2 0 1 2   SAB IAB Ectopic Molar Multiple Live Births   0 0 0   0 2      # Outcome Date GA Lbr Dennis/2nd Weight Sex Delivery Anes PTL Lv   3 Term 10/13/22 37w5d  6 lb 7.2 oz (2.925 kg) F CS-LTranv Spinal N KALEB   2 Term 12 37w2d  6 lb 5 oz (2.863 kg) M CS-Unspec   KALEB      Birth Comments: System Generated. Please review and update pregnancy details. 1 AB 11 5w0d       DEC         Her baby is doing well. She has had the following significant problems since her delivery:   Inc pain improved. Baby smiling. Per Rooming Note:  Postpartum Depression: High Risk    Last EPDS Total Score: 13    Last EPDS Self Harm Result: Never         Type of delivery: repeat  section  Date of Delivery: 10/13/22  Breastfeeding: no  Bleeding Resolved:  yes & cycles resumed  Birth Control: Depo injection. Last Pap: 3/2022 WNL.     Visit Vitals  BP (!) 141/89   Pulse 78   Ht 5' 7\" (1.702 m)   Wt 150 lb (68 kg)   LMP 2022   Breastfeeding No   BMI 23.49 kg/m²       PHYSICAL EXAMINATION    Constitutional  Appearance: well-nourished, well developed, alert, in no acute distress    HENT  Head and Face: appears normal    Neck  Inspection/Palpation: normal appearance, no masses or tenderness  Lymph Nodes: no lymphadenopathy present  Thyroid: gland size normal, nontender, no nodules or masses present on palpation    Breasts  Inspection of Breasts: breasts symmetrical, no skin changes, no discharge present, nipple appearance normal, no skin retraction present  Palpation of Breasts and Axillae: no masses present on palpation, no breast tenderness  Axillary Lymph Nodes: no lymphadenopathy present    Gastrointestinal  Abdominal Examination: abdomen non-tender to palpation, normal bowel sounds, no masses present  Liver and spleen: no hepatomegaly present, spleen not palpable  Hernias: no hernias identified    Genitourinary  External Genitalia: normal appearance for age, no discharge present, no tenderness present, no inflammatory lesions present, no masses present, no atrophy present  Vagina: normal vaginal vault without central or paravaginal defects, bloody discharge present, no inflammatory lesions present, no masses present  Bladder: non-tender to palpation  Urethra: appears normal  Cervix: normal   Uterus: normal size, shape and consistency  Adnexa: no adnexal tenderness present, no adnexal masses present  Perineum: perineum within normal limits, no evidence of trauma, no rashes or skin lesions present  Anus: anus within normal limits, no hemorrhoids present  Inguinal Lymph Nodes: no lymphadenopathy present    Skin  General Inspection: no rash, no lesions identified    Neurologic/Psychiatric  Mental Status:  Orientation: grossly oriented to person, place and time  Mood and Affect: mood normal, affect appropriate      Assessment:  Postpartum exam, doing well  Encounter Diagnoses   Name Primary? Postpartum exam Yes    Elevated blood pressure reading        Plan:  RTO for AE or sooner prn  We discussed contraception options; r/b/a. Planned contraception: depo  We discussed progesterone only and non hormonal options for contraception including but not limited to condoms, IUDs, Nexplanon, and depo provera.    She should return to normal physical activity  We recommend healthy balanced diet, regular exercise  Disc starting depo and bringing med for a nurse visit  BP log,  FU BP check 2-3 weeks

## 2022-12-01 NOTE — PROGRESS NOTES
Karthik Navarro is a 28 y.o. female returns for a routine post-partum follow-up visit     Chief Complaint   Patient presents with    Post-Partum Care       Postpartum Depression: High Risk    Last EPDS Total Score: 13    Last EPDS Self Harm Result: Never       Type of delivery: repeat  section  Date of Delivery: 10/13/22  Breastfeeding: no  Bleeding Resolved:  yes & cycles resumed  Birth Control: Depo injection. Last Pap: 3/2022 WNL. Problems: no significant problems    1. Have you been to the ER, urgent care clinic, or hospitalized since your last visit? No    2. Have you seen or consulted any other health care providers outside of the [address] [place] [address] since your last visit?  No    Examination chaperoned by Esau Jimenez MA.

## 2022-12-13 ENCOUNTER — PATIENT MESSAGE (OUTPATIENT)
Dept: OBGYN CLINIC | Age: 32
End: 2022-12-13

## 2023-03-30 ENCOUNTER — PATIENT MESSAGE (OUTPATIENT)
Dept: OBGYN CLINIC | Age: 33
End: 2023-03-30

## 2023-03-31 ENCOUNTER — OFFICE VISIT (OUTPATIENT)
Dept: OBGYN CLINIC | Age: 33
End: 2023-03-31
Payer: MEDICAID

## 2023-03-31 VITALS
BODY MASS INDEX: 22.47 KG/M2 | HEIGHT: 67 IN | WEIGHT: 143.2 LBS | SYSTOLIC BLOOD PRESSURE: 141 MMHG | HEART RATE: 73 BPM | DIASTOLIC BLOOD PRESSURE: 98 MMHG

## 2023-03-31 DIAGNOSIS — Z76.89 ENCOUNTER FOR MENSTRUAL REGULATION: ICD-10-CM

## 2023-03-31 DIAGNOSIS — Z01.419 ENCOUNTER FOR GYNECOLOGICAL EXAMINATION (GENERAL) (ROUTINE) WITHOUT ABNORMAL FINDINGS: Primary | ICD-10-CM

## 2023-03-31 PROCEDURE — 99395 PREV VISIT EST AGE 18-39: CPT | Performed by: OBSTETRICS & GYNECOLOGY

## 2023-03-31 RX ORDER — MEDROXYPROGESTERONE ACETATE 150 MG/ML
150 INJECTION, SUSPENSION INTRAMUSCULAR ONCE
Qty: 1 ML | Refills: 4 | Status: SHIPPED | OUTPATIENT
Start: 2023-03-31 | End: 2023-03-31

## 2023-03-31 NOTE — PROGRESS NOTES
[facility]  [email or web address]  [phone]    Smitha Chua MD, [facility]     Annual Gynecologic Exam:  No chief complaint on file. Marley Walker is a ,  28 y.o. female   Patient's last menstrual period was 2023. She presents for her annual checkup. She is having significant heavier periods and cramping, wants to start depo provera but + unprotected int 4 days ago and in last 2 weeks. Juanisshahrzad Sanchez Sexual history and Contraception:  Social History     Substance and Sexual Activity   Sexual Activity Yes    Partners: Male    Birth control/protection: None       Past Medical History:   Diagnosis Date    Abnormal Pap smear     LGSIL-colpo    Depression affecting pregnancy 3/25/2022    Encounter for Papanicolaou smear for cervical cancer screening 2022    neg/hpv neg    Essential hypertension     blood pressure up in office today    HX OTHER MEDICAL     recurrent UTI     Current Outpatient Medications   Medication Sig    ibuprofen (MOTRIN) 600 mg tablet Take 1 Tablet by mouth every six (6) hours as needed for Pain. Take with food. (Patient not taking: No sig reported)    Ferrous Fumarate 325 mg (106 mg iron) tab Take 1 Tablet by mouth. (Patient not taking: No sig reported)    BABY ASPIRIN PO Take  by mouth. (Patient not taking: No sig reported)    magnesium 250 mg tab Take  by mouth. (Patient not taking: No sig reported)    acetaminophen (TYLENOL) 325 mg tablet Take  by mouth every four (4) hours as needed for Pain. (Patient not taking: No sig reported)    calcium carbonate (TUMS) 200 mg calcium (500 mg) chew Take 1 Tablet by mouth daily. (Patient not taking: No sig reported)    promethazine (PHENERGAN) 12.5 mg tablet Take 1 Tablet by mouth every six (6) hours as needed for Nausea (patient may take 1/2 dose if causing drowsiness.). (Patient not taking: No sig reported)    escitalopram oxalate (LEXAPRO) 10 mg tablet Take 5 mg by mouth every other day.  (Patient not taking: No sig reported)    prenatal vit-iron fumarate-fa 27 mg iron- 0.8 mg tab tablet Take 1 Tablet by mouth daily. (Patient not taking: No sig reported)     No current facility-administered medications for this visit. OB History    Para Term  AB Living   3 2 2 0 1 2   SAB IAB Ectopic Molar Multiple Live Births   0 0 0   0 2      # Outcome Date GA Lbr Dennis/2nd Weight Sex Delivery Anes PTL Lv   3 Term 10/13/22 37w5d  6 lb 7.2 oz (2.925 kg) F CS-LTranv Spinal N KALEB   2 Term 12 37w2d  6 lb 5 oz (2.863 kg) M CS-Unspec   KALEB      Birth Comments: System Generated. Please review and update pregnancy details. 1 AB 11 5w0d       DEC     History reviewed. No pertinent surgical history.   Family History   Problem Relation Age of Onset    Diabetes Mother     Hypertension Mother     Psychiatric Disorder Brother     Mental Retardation Brother     Heart Disease Maternal Grandmother     Hypertension Maternal Grandmother     Hypertension Maternal Grandfather     Diabetes Paternal Grandfather     Elevated Lipids Paternal Grandfather      Social History     Socioeconomic History    Marital status: SINGLE     Spouse name: Not on file    Number of children: Not on file    Years of education: Not on file    Highest education level: Not on file   Occupational History    Not on file   Tobacco Use    Smoking status: Every Day     Types: Cigarettes     Last attempt to quit: 2014     Years since quittin.6    Smokeless tobacco: Never   Vaping Use    Vaping Use: Never used   Substance and Sexual Activity    Alcohol use: No    Drug use: Yes     Types: Marijuana    Sexual activity: Yes     Partners: Male     Birth control/protection: None   Other Topics Concern    Not on file   Social History Narrative    ** Merged History Encounter **          Social Determinants of Health     Financial Resource Strain: Not on file   Food Insecurity: Not on file   Transportation Needs: Not on file   Physical Activity: Not on file   Stress: Not on file   Social Connections: Not on file   Intimate Partner Violence: Not on file   Housing Stability: Not on file     Tobacco History:  reports that she has been smoking cigarettes. She has never used smokeless tobacco.  Alcohol Abuse:  reports no history of alcohol use. Drug Abuse:  reports current drug use. Drug: Marijuana. No Known Allergies    Patient Active Problem List   Diagnosis Code    Missed  O02.1    Gestational hypertension O13.9    H/O:  Z98.891    Prenatal care of multigravida, antepartum Z34.80    Depression affecting pregnancy O99.340, F32. A    Elevated blood pressure affecting pregnancy in third trimester, antepartum O16.3    Pregnancy Z34.90       Review of Systems - History obtained from the patient and patient filled out questionnaire   Constitutional/general, HEENT, CV, Resp, GI, MSK, Neuro, Psych, Heme/lymph, Skin, Breast ROS: no significant complaints except as noted on HPI    Physical Exam  Visit Vitals  BP (!) 141/98   Pulse 73   Ht 5' 7\" (1.702 m)   Wt 143 lb 3.2 oz (65 kg)   LMP 2023   Breastfeeding No   BMI 22.43 kg/m²       Constitutional  Appearance: well-nourished, well developed, alert, in no acute distress    HENT  Head and Face: appears normal    Neck  Inspection/Palpation: normal appearance, no masses or tenderness  Lymph Nodes: no lymphadenopathy present  Thyroid: gland size normal, nontender, no nodules or masses present on palpation    Chest  Respiratory Effort: breathing unlabored  Auscultation: normal breath sounds    Cardiovascular  Heart:   Auscultation: regular rate and rhythm without murmur    Breasts  Inspection of Breasts: breasts symmetrical, no skin changes, no discharge present, nipple appearance normal, no skin retraction present  Palpation of Breasts and Axillae: no masses present on palpation, no breast tenderness  Axillary Lymph Nodes: no lymphadenopathy present    Gastrointestinal  Abdominal Examination: abdomen non-tender to palpation, normal bowel sounds, no masses present  Liver and spleen: no hepatomegaly present, spleen not palpable  Hernias: no hernias identified    Genitourinary  External Genitalia: normal appearance for age, no discharge present, no tenderness present, no inflammatory lesions present, no masses present  Vagina: normal vaginal vault without central or paravaginal defects, blood tinged discharge present, no inflammatory lesions present, no masses present  Bladder: non-tender to palpation  Urethra: appears normal  Cervix: normal   Uterus: normal size, shape and consistency  Adnexa: no adnexal tenderness present, no adnexal masses present  Perineum: perineum within normal limits, no evidence of trauma, no rashes or skin lesions present  Anus: anus within normal limits, no hemorrhoids present  Inguinal Lymph Nodes: no lymphadenopathy present    Skin  General Inspection: no rash, no lesions identified    Neurologic/Psychiatric  Mental Status:  Orientation: grossly oriented to person, place and time  Mood and Affect: mood normal, affect appropriate    Assessment:  28 y.o.  for well woman exam  Her current medical status is satisfactory with no evidence of significant gynecologic issues. Encounter Diagnoses   Name Primary? Encounter for gynecological examination (general) (routine) without abnormal findings Yes    Encounter for menstrual regulation        Plan:  I recommended follow up one year for routine annual gynecologic exam or sooner prn  Patient should follow up with a primary care physician for chronic medical problems and evaluation of non-gynecologic concerns and to please contact our office with any GYN questions or concerns. I recommend STD testing per CDC guidelines and at patient request.   Disc depo provera new start per protocol  Too late for EC/plan B  We discussed elevated blood pressure reading.   I recommend BP log/home BP cuff and PCP follow up for additional management/surveillance. Folllow up:  [x] return for annual well woman exam in one year or sooner if she is having problems  [] follow up and ultrasound  [] 6 months  [] 3 months  [] 6 weeks   [] 1 month    No orders of the defined types were placed in this encounter. No results found for any visits on 03/31/23.

## 2023-03-31 NOTE — PROGRESS NOTES
Megha Velazquez is a 28 y.o. female returns for an annual exam     Chief Complaint   Patient presents with    Well Woman         Patient's last menstrual period was 03/29/2023. Her periods are heavy in flow and usually regular with a 26-32 day interval with 3-7 day duration. She has dysmenorrhea. Problems:  wants to start back on depo , she has had unprotected intercourse the past 2 weeks but she is on her cycle  Birth Control: none. Last Pap: normal obtained 1 year(s) ago. She does not have a history of MAREK 2, 3 or cervical cancer. With regard to the Gardisil vaccine, she  is unsure .       Examination chaperoned by Seda Alegria MA.

## 2023-10-11 RX ORDER — MEDROXYPROGESTERONE ACETATE 150 MG/ML
INJECTION, SUSPENSION INTRAMUSCULAR
Qty: 1 ML | Refills: 1 | OUTPATIENT
Start: 2023-10-11

## 2024-04-03 ENCOUNTER — OFFICE VISIT (OUTPATIENT)
Age: 34
End: 2024-04-03
Payer: MEDICAID

## 2024-04-03 VITALS — WEIGHT: 159 LBS | BODY MASS INDEX: 24.9 KG/M2 | DIASTOLIC BLOOD PRESSURE: 89 MMHG | SYSTOLIC BLOOD PRESSURE: 142 MMHG

## 2024-04-03 DIAGNOSIS — Z01.419 ENCOUNTER FOR GYNECOLOGICAL EXAMINATION: Primary | ICD-10-CM

## 2024-04-03 DIAGNOSIS — Z11.3 VENEREAL DISEASE SCREENING: ICD-10-CM

## 2024-04-03 DIAGNOSIS — N93.9 ABNORMAL UTERINE BLEEDING (AUB): ICD-10-CM

## 2024-04-03 LAB
HCG, PREGNANCY, URINE, POC: NEGATIVE
VALID INTERNAL CONTROL, POC: YES

## 2024-04-03 PROCEDURE — 99395 PREV VISIT EST AGE 18-39: CPT | Performed by: OBSTETRICS & GYNECOLOGY

## 2024-04-03 PROCEDURE — 81025 URINE PREGNANCY TEST: CPT | Performed by: OBSTETRICS & GYNECOLOGY

## 2024-04-03 RX ORDER — MIRTAZAPINE 15 MG/1
15 TABLET, FILM COATED ORAL NIGHTLY
COMMUNITY

## 2024-04-03 RX ORDER — HYDROXYZINE PAMOATE 25 MG/1
25 CAPSULE ORAL 3 TIMES DAILY PRN
COMMUNITY

## 2024-04-03 NOTE — PROGRESS NOTES
Deyanira Mena is a 33 y.o. female returns for an annual exam     Chief Complaint   Patient presents with    Annual Exam       Patient's last menstrual period was 02/13/2024.  Her periods are heavy in flow and often irregular with no apparent pattern.  She has dysmenorrhea.  Problems: problems - patient reports having consistent bleeding since Feb 13. She reports bleeding is heavy and she has been passing clots. She reports over the last few weeks she has had headaches, lightheadedness, and blurry vision.  Birth Control: none. Patient was on depo; reports last injection 4/2023  Last Pap: see report obtained 2 year(s) ago.  She does not have a history of RTA 2, 3 or cervical cancer.       1. Have you been to the ER, urgent care clinic, or hospitalized since your last visit? No    2. Have you seen or consulted any other health care providers outside of the [facility] since your last visit? No    Examination chaperoned by Adriana Luevano LPN.

## 2024-04-03 NOTE — PROGRESS NOTES
[facility]  [email or web address]  [phone]    Marilu Ren MD, FACOG        Annual Gynecologic Exam:  Chief Complaint   Patient presents with    Annual Exam       Deyanira Mena is a ,  33 y.o. female   Patient's last menstrual period was 2024.    The patient presents for her annual gynecologic checkup.     The patient is having problems - persistent bleeding x mid Feb.  +cramping  Desires pregnancy.  Pain left hip to right hip/cramping.   Tyl ES helps.       Per Rooming Note:  Patient's last menstrual period was 2024.  Her periods are heavy in flow and often irregular with no apparent pattern.  She has dysmenorrhea.  Problems: problems - patient reports having consistent bleeding since . She reports bleeding is heavy and she has been passing clots. She reports over the last few weeks she has had headaches, lightheadedness, and blurry vision.  Birth Control: none. Patient was on depo; reports last injection 2023  Last Pap: see report obtained 2 year(s) ago.  She does not have a history of TRA 2, 3 or cervical cancer.     Sexual history and Contraception:    Social History     Substance and Sexual Activity   Sexual Activity Not on file      Past Medical History:   Diagnosis Date    Abnormal Pap smear     LGSIL-colpo    Depression affecting pregnancy 3/25/2022    Encounter for Papanicolaou smear for cervical cancer screening 2022    neg/hpv neg    Essential hypertension     blood pressure up in office today     Current Outpatient Medications   Medication Sig Dispense Refill    hydrOXYzine pamoate (VISTARIL) 25 MG capsule Take 1 capsule by mouth 3 times daily as needed for Itching      mirtazapine (REMERON) 15 MG tablet Take 1 tablet by mouth nightly      BABY ASPIRIN PO Take by mouth (Patient not taking: Reported on 4/3/2024)      acetaminophen (TYLENOL) 325 MG tablet Take by mouth every 4 hours as needed (Patient not taking: Reported on 4/3/2024)

## 2024-04-04 LAB
ERYTHROCYTE [DISTWIDTH] IN BLOOD BY AUTOMATED COUNT: 11.1 % (ref 11.7–15.4)
HCT VFR BLD AUTO: 39.7 % (ref 34–46.6)
HGB BLD-MCNC: 12.8 G/DL (ref 11.1–15.9)
MCH RBC QN AUTO: 31.1 PG (ref 26.6–33)
MCHC RBC AUTO-ENTMCNC: 32.2 G/DL (ref 31.5–35.7)
MCV RBC AUTO: 96 FL (ref 79–97)
PLATELET # BLD AUTO: 284 X10E3/UL (ref 150–450)
RBC # BLD AUTO: 4.12 X10E6/UL (ref 3.77–5.28)
SPECIMEN STATUS REPORT: NORMAL
WBC # BLD AUTO: 6.2 X10E3/UL (ref 3.4–10.8)

## 2024-04-05 LAB — TSH SERPL DL<=0.005 MIU/L-ACNC: 0.47 UIU/ML (ref 0.45–4.5)

## 2024-04-06 LAB
C TRACH RRNA SPEC QL NAA+PROBE: NEGATIVE
N GONORRHOEA RRNA SPEC QL NAA+PROBE: NEGATIVE
T VAGINALIS RRNA SPEC QL NAA+PROBE: NEGATIVE

## 2024-04-29 ENCOUNTER — OFFICE VISIT (OUTPATIENT)
Age: 34
End: 2024-04-29
Payer: MEDICAID

## 2024-04-29 VITALS — SYSTOLIC BLOOD PRESSURE: 138 MMHG | DIASTOLIC BLOOD PRESSURE: 86 MMHG | BODY MASS INDEX: 24.75 KG/M2 | WEIGHT: 158 LBS

## 2024-04-29 DIAGNOSIS — N93.9 ABNORMAL UTERINE BLEEDING (AUB): Primary | ICD-10-CM

## 2024-04-29 DIAGNOSIS — N83.201 RIGHT OVARIAN CYST: ICD-10-CM

## 2024-04-29 DIAGNOSIS — R93.89 ABNORMAL GENITOURINARY ULTRASOUND: ICD-10-CM

## 2024-04-29 LAB
HCG, PREGNANCY, URINE, POC: NEGATIVE
VALID INTERNAL CONTROL, POC: YES

## 2024-04-29 PROCEDURE — 99212 OFFICE O/P EST SF 10 MIN: CPT | Performed by: OBSTETRICS & GYNECOLOGY

## 2024-04-29 PROCEDURE — 81025 URINE PREGNANCY TEST: CPT | Performed by: OBSTETRICS & GYNECOLOGY

## 2024-04-29 NOTE — PROGRESS NOTES
[facility]  [email or web address]  [email or web address]  [phone]    Marilu Ren MD, FACOG      OB/GYN Problem visit    HPI  Deyanira Mena is a , 33 y.o. female who presents for a problem visit.   Chief Complaint   Patient presents with    Follow-up    Ultrasound     AUB       Bleeding stopped.  Had us today  Not interested in contraception but wants to wait to try to conceive.     Per Rooming Note:  Adriana Luevano LPN Licensed Nurse 10:04 AM        Rooming note, gyn problem visit:          Chief Complaint   Patient presents with    Follow-up    Ultrasound       AUB      Deyanira Mena is a 33 y.o. female presents for a problem visit.     Patient's last menstrual period was 2024.  Birth Control: none.     Last or next WWE is: 2024     The patient is reporting having: reports having an abnormal cycle that lasted from 2024 to 2024. Patient reports not having any bleeding since.  Patient has ultrasound today that showed the following :  TV ULTRASOUND PERFORMED. UTERUS IS ANTEVERTED, NORMAL IN SIZE AND ECHOGENICITY. THE ENDOMETRIUM APPEARS HETEROGENEOUS AND MEASURES 10-11MM IN THICKNESS. THERE APPEARS TO BE AN ECHOGENIC STRUCTURE SEEN WITHIN THE ENDOMETRIUM THAT MEASURES 9 X 11MM. BLOODFLOW IS PRESENT. RIGHT OVARY APPEARS TO HAVE A COMPLEX CYST WITH BLOODFLOW IN THE PERIPHERY THAT MEASURES 49 X 35 X 48MM. LEFT OVARY APPEARS WITHIN NORMAL LIMITS. FREE FLUID SEEN IN THE CDS.   This is not a new problem.  She has experienced this problem before.     She reports the symptoms are is unchanged.  Aggravating factors include none.  And alleviating factors include none.          Sexual history and Contraception:  Social History     Substance and Sexual Activity   Sexual Activity Not on file       Past Medical History:   Diagnosis Date    Abnormal Pap smear     LGSIL-colpo    Depression affecting pregnancy 3/25/2022

## 2024-04-29 NOTE — PROGRESS NOTES
Rooming note, gyn problem visit:    Chief Complaint   Patient presents with    Follow-up    Ultrasound     ZHANNA Mena is a 33 y.o. female presents for a problem visit.    Patient's last menstrual period was 02/13/2024.  Birth Control: none.    Last or next WWE is: 4/4/2024    The patient is reporting having: reports having an abnormal cycle that lasted from 2/13/2024 to 4/4/2024. Patient reports not having any bleeding since.  Patient has ultrasound today that showed the following :  TV ULTRASOUND PERFORMED. UTERUS IS ANTEVERTED, NORMAL IN SIZE AND ECHOGENICITY. THE ENDOMETRIUM APPEARS HETEROGENEOUS AND MEASURES 10-11MM IN THICKNESS. THERE APPEARS TO BE AN ECHOGENIC STRUCTURE SEEN WITHIN THE ENDOMETRIUM THAT MEASURES 9 X 11MM. BLOODFLOW IS PRESENT. RIGHT OVARY APPEARS TO HAVE A COMPLEX CYST WITH BLOODFLOW IN THE PERIPHERY THAT MEASURES 49 X 35 X 48MM. LEFT OVARY APPEARS WITHIN NORMAL LIMITS. FREE FLUID SEEN IN THE CDS.   This is not a new problem.  She has experienced this problem before.    She reports the symptoms are is unchanged.  Aggravating factors include none.  And alleviating factors include none.    She does not have other concerns.      1. Have you been to the ER, urgent care clinic, or hospitalized since your last visit?{  no    2. Have you seen or consulted any other health care providers outside of the [facility] since your last visit?  no

## 2024-07-22 ENCOUNTER — OFFICE VISIT (OUTPATIENT)
Age: 34
End: 2024-07-22
Payer: MEDICAID

## 2024-07-22 VITALS — SYSTOLIC BLOOD PRESSURE: 133 MMHG | BODY MASS INDEX: 24.43 KG/M2 | DIASTOLIC BLOOD PRESSURE: 81 MMHG | WEIGHT: 156 LBS

## 2024-07-22 DIAGNOSIS — N93.9 ABNORMAL UTERINE BLEEDING (AUB): Primary | ICD-10-CM

## 2024-07-22 DIAGNOSIS — N83.201 RIGHT OVARIAN CYST: ICD-10-CM

## 2024-07-22 PROCEDURE — 99212 OFFICE O/P EST SF 10 MIN: CPT | Performed by: OBSTETRICS & GYNECOLOGY

## 2024-07-22 NOTE — PROGRESS NOTES
Rooming note, gyn problem visit:    Chief Complaint   Patient presents with    Follow-up     4/29/2024 abnormal uterine bleeding    Ultrasound     Deyanira Mena is a 33 y.o. female presents for a problem visit.    Patient's last menstrual period was 06/12/2024.  Birth Control: none.    Last or next WWE is: 4/2025    The patient is reporting having: follow up today from office visit on 4/29/2024 for abnormal uterine bleeding. Patient reports bleeding has improved and has become more regular.  Ultrasound showed the following:  TRANSVAGINAL ULTRASOUND PERFORMED UTERUS IS ANTEVERTED, NORMAL IN SIZE AND ECHOGENICITY. ENDOMETRIUM MEASURES 8-9MM IN THICKNESS. BLOODFLOW IS PRESENT. A POLYP OR MASS CANNOT BE RULED OUT. RIGHT OVARY APPEARS WITHIN NORMAL LIMITS. LEFT OVARY APPEARS WITHIN NORMAL LIMITS. NO FREE FLUID SEEN IN THE CDS.   This is not a new problem.  She has experienced this problem before.    She reports the symptoms are improved  Aggravating factors include none.  And alleviating factors include none.    She does not have other concerns.      1. Have you been to the ER, urgent care clinic, or hospitalized since your last visit?{  no    2. Have you seen or consulted any other health care providers outside of the [facility] since your last visit?  no

## 2024-07-22 NOTE — PROGRESS NOTES
[facility]  [email or web address]  [email or web address]  [phone]    Marilu Ren MD, FACOG      OB/GYN Problem visit    HPI  Deyanira Mena is a , 33 y.o. female who presents for a problem visit.   Chief Complaint   Patient presents with    Follow-up     2024 abnormal uterine bleeding    Ultrasound       Doing well no aub, no pain  Menstrual cycles regular.  NO significant pelvic pain.  Had us today.  She does not have other concerns.  Starting next cycle soon.    Per Rooming Note:  The patient is reporting having: follow up today from office visit on 2024 for abnormal uterine bleeding. Patient reports bleeding has improved and has become more regular.  Ultrasound showed the following:  TRANSVAGINAL ULTRASOUND PERFORMED UTERUS IS ANTEVERTED, NORMAL IN SIZE AND ECHOGENICITY. ENDOMETRIUM MEASURES 8-9MM IN THICKNESS. BLOODFLOW IS PRESENT. A POLYP OR MASS CANNOT BE RULED OUT. RIGHT OVARY APPEARS WITHIN NORMAL LIMITS. LEFT OVARY APPEARS WITHIN NORMAL LIMITS. NO FREE FLUID SEEN IN THE CDS.   This is not a new problem.  She has experienced this problem before.     She reports the symptoms are improved  Aggravating factors include none.  And alleviating factors include none.       Sexual history and Contraception:  Social History     Substance and Sexual Activity   Sexual Activity Not on file       Past Medical History:   Diagnosis Date    Abnormal Pap smear     LGSIL-colpo    Depression affecting pregnancy 3/25/2022    Encounter for Papanicolaou smear for cervical cancer screening 2022    neg/hpv neg    Essential hypertension     blood pressure up in office today         Current Outpatient Medications:     hydrOXYzine pamoate (VISTARIL) 25 MG capsule, Take 1 capsule by mouth 3 times daily as needed for Itching, Disp: , Rfl:     mirtazapine (REMERON) 15 MG tablet, Take 1 tablet by mouth nightly, Disp: , Rfl:     No

## (undated) DEVICE — LARGE, DISPOSABLE ALEXIS O C-SECTION PROTECTOR - RETRACTOR: Brand: ALEXIS ® O C-SECTION PROTECTOR - RETRACTOR

## (undated) DEVICE — PAD,ABDOMINAL,5"X9",ST,LF,25/BX: Brand: MEDLINE INDUSTRIES, INC.

## (undated) DEVICE — REM POLYHESIVE ADULT PATIENT RETURN ELECTRODE: Brand: VALLEYLAB

## (undated) DEVICE — CANISTER, RIGID, 3000CC: Brand: MEDLINE INDUSTRIES, INC.

## (undated) DEVICE — STRIP,CLOSURE,WOUND,MEDI-STRIP,1/2X4: Brand: MEDLINE

## (undated) DEVICE — STERILE POLYISOPRENE POWDER-FREE SURGICAL GLOVES: Brand: PROTEXIS

## (undated) DEVICE — STRAP,POSITIONING,KNEE/BODY,FOAM,4X60": Brand: MEDLINE

## (undated) DEVICE — BLADE ASSEMB CLP HAIR FINE --

## (undated) DEVICE — ROCKER SWITCH PENCIL HOLSTER: Brand: VALLEYLAB

## (undated) DEVICE — GARMENT,MEDLINE,DVT,INT,CALF,MED, GEN2: Brand: MEDLINE

## (undated) DEVICE — TRAY,URINE METER,100% SILICONE,16FR10ML: Brand: MEDLINE

## (undated) DEVICE — STERILE LATEX POWDER-FREE SURGICAL GLOVESWITH NITRILE COATING: Brand: PROTEXIS

## (undated) DEVICE — SYRINGE IRRIG 60ML SFT PLIABLE BLB EZ TO GRP 1 HND USE W/

## (undated) DEVICE — GLOVE SURG SZ 7 L12IN FNGR THK79MIL GRN LTX FREE

## (undated) DEVICE — SUTURE VCRL SZ 0 L36IN ABSRB VLT L40MM CT 1/2 CIR J358H

## (undated) DEVICE — SOLUTION IRRIG 1000ML 0.9% SOD CHL USP POUR PLAS BTL

## (undated) DEVICE — C-SECTION II-LF: Brand: MEDLINE INDUSTRIES, INC.

## (undated) DEVICE — TOWEL,OR,DSP,ST,BLUE,STD,2/PK,40PK/CS: Brand: MEDLINE

## (undated) DEVICE — POOLE SUCTION INSTRUMENT WITH REMOVABLE SHEATH: Brand: POOLE

## (undated) DEVICE — SOLIDIFIER FLUID 3000 CC ABSORB

## (undated) DEVICE — GLOVE SURG SZ 65 THK91MIL LTX FREE SYN POLYISOPRENE

## (undated) DEVICE — GOWN,AURORA,FABRIC-REINFORCED,X-LARGE: Brand: MEDLINE

## (undated) DEVICE — PREP SKN CHLRAPRP APL 26ML STR --

## (undated) DEVICE — HANDLE LT SNAP ON ULT DURABLE LENS FOR TRUMPF ALC DISPOSABLE

## (undated) DEVICE — SUTURE VCRL SZ 2-0 L36IN ABSRB UD L36MM CT-1 1/2 CIR J945H

## (undated) DEVICE — SUTURE ABSRB BRAID COAT UD CT NO 1 36IN VCRL J959H

## (undated) DEVICE — TIP CLEANER: Brand: VALLEYLAB

## (undated) DEVICE — SUTURE MCRYL SZ 3-0 L27IN ABSRB UD L60MM KS STR REV CUT Y523H